# Patient Record
Sex: FEMALE | Race: WHITE | Employment: FULL TIME | URBAN - METROPOLITAN AREA
[De-identification: names, ages, dates, MRNs, and addresses within clinical notes are randomized per-mention and may not be internally consistent; named-entity substitution may affect disease eponyms.]

---

## 2018-09-10 ENCOUNTER — HOSPITAL ENCOUNTER (OUTPATIENT)
Dept: GENERAL RADIOLOGY | Age: 23
Discharge: HOME | End: 2018-09-10
Payer: COMMERCIAL

## 2018-09-10 DIAGNOSIS — M79.671 RIGHT FOOT PAIN: ICD-10-CM

## 2018-09-10 PROCEDURE — 73630 X-RAY EXAM OF FOOT: CPT

## 2019-03-29 ENCOUNTER — OFFICE VISIT (OUTPATIENT)
Dept: ORTHOPEDIC SURGERY | Age: 24
End: 2019-03-29

## 2019-03-29 VITALS
BODY MASS INDEX: 23.95 KG/M2 | WEIGHT: 158 LBS | HEIGHT: 68 IN | SYSTOLIC BLOOD PRESSURE: 116 MMHG | TEMPERATURE: 96.8 F | HEART RATE: 70 BPM | DIASTOLIC BLOOD PRESSURE: 76 MMHG | OXYGEN SATURATION: 98 %

## 2019-03-29 DIAGNOSIS — M99.01 CERVICAL SOMATIC DYSFUNCTION: ICD-10-CM

## 2019-03-29 DIAGNOSIS — M76.31 BILATERAL ILIOTIBIAL BAND TENDINITIS: Primary | ICD-10-CM

## 2019-03-29 DIAGNOSIS — M76.32 BILATERAL ILIOTIBIAL BAND TENDINITIS: Primary | ICD-10-CM

## 2019-03-29 DIAGNOSIS — M99.08 RIB CAGE REGION SOMATIC DYSFUNCTION: ICD-10-CM

## 2019-03-29 DIAGNOSIS — G57.03 PIRIFORMIS SYNDROME OF BOTH SIDES: ICD-10-CM

## 2019-03-29 DIAGNOSIS — M99.02 THORACIC REGION SOMATIC DYSFUNCTION: ICD-10-CM

## 2019-03-29 RX ORDER — ALBUTEROL SULFATE 90 UG/1
AEROSOL, METERED RESPIRATORY (INHALATION)
COMMUNITY
End: 2020-11-09

## 2019-03-29 RX ORDER — MELOXICAM 15 MG/1
TABLET ORAL
Qty: 90 TAB | Refills: 0 | Status: SHIPPED | OUTPATIENT
Start: 2019-03-29 | End: 2019-06-27

## 2019-03-29 RX ORDER — CETIRIZINE HCL 10 MG
TABLET ORAL
COMMUNITY
End: 2019-06-27

## 2019-03-29 NOTE — PROGRESS NOTES
AVS reviewed: YES  HEP: AT demonstrated  Resources Provided: YES YouTube Videos  Patient questions/concerns answered: NO. Pt denied questions/concerns  Patient verbalized understanding of treatment plan: YES

## 2019-03-29 NOTE — PROGRESS NOTES
HISTORY OF PRESENT ILLNESS    Tena Lee is a 25y.o. year old female comes in today as new patient to be evaluated and treated at the request of Evelio Prince MD for my opinion/advice regarding: hip pain B/L    Patients symptoms have been present for a few years. Pain level 6/10 lateral, It has worsened with activity. Patient has tried:  chiro prior. It is described as pain and popping in hips. Has been using prednisone as well which helped a little but was for asthma. Past Surgical History:   Procedure Laterality Date    HX  SECTION      x1    HX OTHER SURGICAL      3rd nipple removed from left side.  HX WISDOM TEETH EXTRACTION       Social History     Socioeconomic History    Marital status: SINGLE     Spouse name: Not on file    Number of children: Not on file    Years of education: Not on file    Highest education level: Not on file   Tobacco Use    Smoking status: Never Smoker    Smokeless tobacco: Never Used   Substance and Sexual Activity    Alcohol use: Yes     Comment: rare    Drug use: No     Types: Marijuana     Comment: formerly used.  Sexual activity: Yes     Birth control/protection: Pill     Current Outpatient Medications   Medication Sig Dispense Refill    docosahexanoic acid (PRENATAL DHA) 200 mg cap capsule Prenatal + DHA   take 1 tablet once a day      albuterol (PROVENTIL HFA, VENTOLIN HFA, PROAIR HFA) 90 mcg/actuation inhaler ProAir HFA 90 mcg/actuation aerosol inhaler      cetirizine (ZYRTEC) 10 mg tablet cetirizine 10 mg tablet   take 1 tablet by mouth at bedtime      nitrofurantoin, macrocrystal-monohydrate, (MACROBID) 100 mg capsule Take 100 mg by mouth two (2) times a day.  ALPRAZolam (XANAX) 0.5 mg tablet Take  by mouth.  HYDROcodone-acetaminophen (NORCO) 5-325 mg per tablet Take  by mouth.        Past Medical History:   Diagnosis Date    Anxiety     Asthma     Depression     Environmental allergies     H/O seasonal allergies     Other ill-defined conditions(799.89)     PID    Post partum depression     Psychiatric problem     anxiety     Family History   Problem Relation Age of Onset    Cancer Mother     Asthma Mother     Thyroid Disease Mother     No Known Problems Father          ROS:  No numb, tingle, fever, incont  All other systems reviewed and negative aside from that written in the HPI. Objective:  /76   Pulse 70   Temp 96.8 °F (36 °C) (Oral)   Ht 5' 7.5\" (1.715 m)   Wt 158 lb (71.7 kg)   SpO2 98%   BMI 24.38 kg/m²   GEN:  Appears stated age in NAD. HEAD:  Normocephalic, Atraumatic. NEURO:  Sensation intact to light touch. Reflexes +2/4 patellar and Achilles bilaterally. M/S: Examined standing and supine. Slump negative. TTA at C2, 4, 6 on left worse flexion and T2, 4, 5, 7 on left worse flexion Ribs 3, 4, 6 posteriro and TTP left. LE Strength +5/5 bilaterally JUAN negative bilateral .  Internal rotation normal. bilaterally  positive TTP over greater trochanter. Piriformis tender and tight bilateral   Clark's test positive bilateral  Remington test negative for hip flexor and quad bilateral   EXT: no clubbing/cyanosis. no edema. SKIN: Warm/dry without rash. HEENT: Conjunctiva/lids WNL. External canals/nares WNL. Tongue midline. PERRL, EOMI. Hearing intact. NECK: Trachea midline. Supple, Full ROM. No thyromegaly. CARDIAC: No edema. LUNGS: Normal effort. ABD: Soft, NT. No HSM. PSYCH: A+O x3. Appropriate judgment and insight. Assessment/Plan:     ICD-10-CM ICD-9-CM    1. Bilateral iliotibial band tendinitis M76.31 728.89 meloxicam (MOBIC) 15 mg tablet    M76.32     2. Piriformis syndrome of both sides G57.01 355.0 meloxicam (MOBIC) 15 mg tablet    G57.02     3.  Thoracic region somatic dysfunction M99.02 739.2 WI OSTEOPATHIC MANIP,3-4 BODY REGN   4. Rib cage region somatic dysfunction M99.08 739.8 WI OSTEOPATHIC MANIP,3-4 BODY REGN   5. Cervical somatic dysfunction M99.01 739.1 WI OSTEOPATHIC MANIP,3-4 BODY REGN       Patient (or guardian if minor) verbalizes understanding of evaluation and plan. Verbal consent obtained. Cervical, Thoracic and Rib SD treated with ME. Correction of previous malalignments verified after Tx. Pt tolerated well. Notes improvement of Sx and pain is now rated 0/10. HEP/stretches daily. Discussed stretching IT bands, piriformis and neck/upper back w/ mobic PRN and RTC 6 weeks.

## 2019-05-16 ENCOUNTER — HOSPITAL ENCOUNTER (OUTPATIENT)
Dept: GENERAL RADIOLOGY | Age: 24
Discharge: HOME | End: 2019-05-16
Payer: COMMERCIAL

## 2019-05-16 DIAGNOSIS — R05.9 COUGH: ICD-10-CM

## 2019-05-16 PROCEDURE — 71046 X-RAY EXAM CHEST 2 VIEWS: CPT

## 2019-11-15 ENCOUNTER — HOSPITAL ENCOUNTER (OUTPATIENT)
Dept: GENERAL RADIOLOGY | Age: 24
Discharge: HOME | End: 2019-11-15
Payer: COMMERCIAL

## 2019-11-15 ENCOUNTER — HOSPITAL ENCOUNTER (OUTPATIENT)
Dept: LAB | Age: 24
Discharge: HOME | End: 2019-11-15
Payer: COMMERCIAL

## 2019-11-15 DIAGNOSIS — R05.9 COUGH: ICD-10-CM

## 2019-11-15 LAB
BASOPHILS # BLD: 0 K/UL (ref 0–0.06)
BASOPHILS NFR BLD: 0 % (ref 0–3)
DIFFERENTIAL METHOD BLD: ABNORMAL
EOSINOPHIL # BLD: 0 K/UL (ref 0–0.4)
EOSINOPHIL NFR BLD: 0 % (ref 0–5)
ERYTHROCYTE [DISTWIDTH] IN BLOOD BY AUTOMATED COUNT: 14 % (ref 11.6–14.5)
HCT VFR BLD AUTO: 46.3 % (ref 35–45)
HGB BLD-MCNC: 15.6 G/DL (ref 12–16)
LYMPHOCYTES # BLD: 4.1 K/UL (ref 0.8–3.5)
LYMPHOCYTES NFR BLD: 28 % (ref 20–51)
MCH RBC QN AUTO: 34.3 PG (ref 24–34)
MCHC RBC AUTO-ENTMCNC: 33.7 G/DL (ref 31–37)
MCV RBC AUTO: 101.8 FL (ref 74–97)
MONOCYTES # BLD: 0.3 K/UL (ref 0–1)
MONOCYTES NFR BLD: 2 % (ref 2–9)
NEUTS BAND NFR BLD MANUAL: 3 % (ref 0–5)
NEUTS SEG # BLD: 10.3 K/UL (ref 1.8–8)
NEUTS SEG NFR BLD: 67 % (ref 42–75)
PLATELET # BLD AUTO: 306 K/UL (ref 135–420)
PLATELET COMMENTS,PCOM: ABNORMAL
PMV BLD AUTO: 10.1 FL (ref 9.2–11.8)
RBC # BLD AUTO: 4.55 M/UL (ref 4.2–5.3)
RBC MORPH BLD: ABNORMAL
WBC # BLD AUTO: 14.7 K/UL (ref 4.6–13.2)

## 2019-11-15 PROCEDURE — 36415 COLL VENOUS BLD VENIPUNCTURE: CPT

## 2019-11-15 PROCEDURE — 85025 COMPLETE CBC W/AUTO DIFF WBC: CPT

## 2019-11-15 PROCEDURE — 71046 X-RAY EXAM CHEST 2 VIEWS: CPT

## 2019-11-15 PROCEDURE — 82785 ASSAY OF IGE: CPT

## 2019-11-18 LAB — IGE SERPL-ACNC: 403 IU/ML (ref 6–495)

## 2020-07-06 ENCOUNTER — HOSPITAL ENCOUNTER (EMERGENCY)
Age: 25
Discharge: HOME | End: 2020-07-06
Attending: EMERGENCY MEDICINE
Payer: COMMERCIAL

## 2020-07-06 VITALS
OXYGEN SATURATION: 97 % | RESPIRATION RATE: 16 BRPM | WEIGHT: 170 LBS | SYSTOLIC BLOOD PRESSURE: 136 MMHG | DIASTOLIC BLOOD PRESSURE: 97 MMHG | HEART RATE: 85 BPM | TEMPERATURE: 98.7 F | BODY MASS INDEX: 25.85 KG/M2

## 2020-07-06 DIAGNOSIS — T50.905A MEDICATION REACTION, INITIAL ENCOUNTER: Primary | ICD-10-CM

## 2020-07-06 PROCEDURE — 74011250637 HC RX REV CODE- 250/637: Performed by: EMERGENCY MEDICINE

## 2020-07-06 PROCEDURE — 99282 EMERGENCY DEPT VISIT SF MDM: CPT

## 2020-07-06 RX ORDER — HYDROXYZINE PAMOATE 25 MG/1
25 CAPSULE ORAL
Status: COMPLETED | OUTPATIENT
Start: 2020-07-06 | End: 2020-07-06

## 2020-07-06 RX ORDER — HYDROXYZINE PAMOATE 25 MG/1
25 CAPSULE ORAL
Qty: 15 CAP | Refills: 0 | Status: SHIPPED | OUTPATIENT
Start: 2020-07-06 | End: 2020-07-20

## 2020-07-06 RX ADMIN — HYDROXYZINE PAMOATE 25 MG: 25 CAPSULE ORAL at 16:42

## 2020-07-06 NOTE — DISCHARGE INSTRUCTIONS
Patient Education     Side Effects of Medicine: Care Instructions  Your Care Instructions  Medicines are a big part of treatment for many health problems. But all medicines have side effects. Often these are mild problems. They might include a dry mouth or upset stomach. But sometimes medicines can cause dangerous side effects. One example is a bad allergic reaction. The best treatment will depend on what side effects you have. If you have a serious side effect, you may need to stop taking the medicine. You may also need to take another medicine to treat the side effect. If you have a mild side effect, it may go away after you take the medicine for a while. The doctor has checked you carefully, but problems can develop later. If you notice any problems or new symptoms, get medical treatment right away. Follow-up care is a key part of your treatment and safety. Be sure to make and go to all appointments, and call your doctor if you are having problems. It's also a good idea to know your test results and keep a list of the medicines you take. How can you care for yourself at home? · Be safe with medicines. Take your medicines exactly as prescribed. Call your doctor if you think you are having a problem with your medicine. · Call your doctor if side effects bother you and you wonder if you should keep taking a medicine. Your doctor may be able to lower your dose or change your medicine. Do not suddenly quit taking your medicine unless a doctor tells you to. · Make sure your doctor has a list of all the medicines, vitamins, supplements, and herbal remedies you take. Ask about side effects. When should you call for help? Call 911 anytime you think you may need emergency care. For example, call if:  · You have symptoms of a severe allergic reaction. These may include:  ¨ Sudden raised, red areas (hives) all over your body. ¨ Swelling of the throat, mouth, lips, or tongue. ¨ Trouble breathing.   ¨ Passing out (losing consciousness). Or you may feel very lightheaded or suddenly feel weak, confused, or restless. Call your doctor now or seek immediate medical care if:  · You have symptoms of an allergic reaction, such as:  ¨ A rash or hives (raised, red areas on the skin). ¨ Itching. ¨ Swelling. ¨ Belly pain, nausea, or vomiting. Watch closely for changes in your health, and be sure to contact your doctor if:  · You think you are having a new problem with your medicine. · You do not get better as expected. Where can you learn more? Go to [employer]  Enter D152 in the search box to learn more about \"Side Effects of Medicine: Care Instructions. \"   © 0656-9666 [employer], [employer]. Care instructions adapted under license by [facility] (which disclaims liability or warranty for this information). This care instruction is for use with your licensed healthcare professional. If you have questions about a medical condition or this instruction, always ask your healthcare professional. [place] [address] any warranty or liability for your use of this information.   Content Version: 08.3.535851; Current as of: November 20, 2015

## 2020-07-06 NOTE — ED PROVIDER NOTES
HPI says she is currently on a prednisone taper due to her asthma. She says she missed 1 of the days of medications recently but then restarted a day later. Over the last 24 hours patient complains of \"feeling weird and tingly all over\". She also complains of numbness and tingling in her fingers and hands and also in her perioral area. Patient also says she has a history of anxiety and is concerned that she may be having an anxiety attack as well. She says her asthma symptoms are doing well now and her current respiratory effort is that of her baseline. She denies any fever chills chest pain abdominal pain or nausea vomiting. No other complaints given at this time. Past Medical History:   Diagnosis Date    ADHD     Alternating constipation and diarrhea     Anxiety     Anxiety and depression     Asthma     Chronic neck and back pain     Cough, persistent     Depression     Environmental allergies     GERD (gastroesophageal reflux disease)     H/O seasonal allergies     Heavy menses     Joint pain     Left-sided chest wall pain     Migraines     Panic attacks     Pelvic inflammatory disease     Pelvic pain     Piercing     bilateral tragus (unable to remove jewelry)    Post partum depression     Psychiatric problem     anxiety    Rash     under buttocks & inner thighs    Seizures ([address]     remote (only once)    SOB (shortness of breath)     Tattoos 2019    recent tattoo       Past Surgical History:   Procedure Laterality Date    HX  SECTION  05/04/2018    x1    HX DILATION AND CURETTAGE  2019    HX OTHER SURGICAL      3rd nipple removed from left side.     HX PELVIC LAPAROSCOPY  2019    diagnostic laparoscopy with lysis of adhesions and ablation of endometriosis    HX WISDOM TEETH EXTRACTION           Family History:   Problem Relation Age of Onset   24 [facility] Cancer Mother         brain    Asthma Mother     Thyroid Disease Mother     No Known Problems Father  Asthma Maternal Grandmother     Lupus Maternal Grandmother        Social History     Socioeconomic History    Marital status: SINGLE     Spouse name: Not on file    Number of children: Not on file    Years of education: Not on file    Highest education level: Not on file   Occupational History    Not on file   Social Needs    Financial resource strain: Not on file    Food insecurity     Worry: Not on file     Inability: Not on file    Transportation needs     Medical: Not on file     Non-medical: Not on file   Tobacco Use    Smoking status: Never Smoker    Smokeless tobacco: Never Used   Substance and Sexual Activity    Alcohol use: Yes     Alcohol/week: 2.0 - 3.0 standard drinks     Types: 2 - 3 Shots of liquor per week     Comment: liquor or wine    Drug use: Not Currently    Sexual activity: Not on file   Lifestyle    Physical activity     Days per week: Not on file     Minutes per session: Not on file    Stress: Not on file   Relationships    Social connections     Talks on phone: Not on file     Gets together: Not on file     Attends Tenriism service: Not on file     Active member of club or organization: Not on file     Attends meetings of clubs or organizations: Not on file     Relationship status: Not on file    Intimate partner violence     Fear of current or ex partner: Not on file     Emotionally abused: Not on file     Physically abused: Not on file     Forced sexual activity: Not on file   Other Topics Concern    Not on file   Social History Narrative    Not on file         ALLERGIES: Adhesive; Adhesive tape-silicones; and Egg white    Review of Systems   Constitutional: Negative. HENT: Negative. Respiratory: Negative. Cardiovascular: Negative. Gastrointestinal: Negative. Genitourinary: Negative. Musculoskeletal: Negative. Neurological: Negative. Psychiatric/Behavioral: Negative.         Vitals:    07/06/20 1605   BP: (!) 136/97   Pulse: 85   Resp: 16 Temp: 98.7 °F (37.1 °C)   SpO2: 97%   Weight: 77.1 kg (170 lb)            Physical Exam  Vitals signs and nursing note reviewed. Constitutional:       Appearance: She is well-developed. HENT:      Head: Normocephalic and atraumatic. Eyes:      Conjunctiva/sclera: Conjunctivae normal.      Pupils: Pupils are equal, round, and reactive to light. Neck:      Musculoskeletal: Normal range of motion and neck supple. Cardiovascular:      Rate and Rhythm: Normal rate and regular rhythm. Pulmonary:      Effort: Pulmonary effort is normal.      Breath sounds: Normal breath sounds. Abdominal:      Palpations: Abdomen is soft. Musculoskeletal: Normal range of motion. Skin:     General: Skin is warm and dry. Neurological:      Mental Status: She is alert and oriented to person, place, and time. MDM       Procedures    Patient has normal clinical exam I spent time talking with her about possible side effects from prednisone taper versus anxiety. After talking with physician patient felt better and she understands and agrees with the disposition and follow-up plan.   Danny Lazaro MD 4:40 PM

## 2020-07-06 NOTE — ED NOTES
Introduced myself as Primary Nurse. Encouraged to voice any concerns and/or change in their condition. Call bell in easy reach. Patient's fall risk assessed, bed locked and in lowest position. Lights are on in room and area around bed dry and free of clutter. Informed staff will be making rounds every half hour or sooner if condition warrants. Will explain tests and reason why they are to be done. Patient knows that as soon as MD reviews results he will be in to update patient. Current CDC guidelines followed upon entering room: Mask, goggles and gloves worn when entering room. Strict handwashing observed after removing gloves after this encounter with patient.

## 2020-11-09 ENCOUNTER — APPOINTMENT (OUTPATIENT)
Dept: GENERAL RADIOLOGY | Age: 25
End: 2020-11-09
Attending: EMERGENCY MEDICINE
Payer: COMMERCIAL

## 2020-11-09 ENCOUNTER — HOSPITAL ENCOUNTER (EMERGENCY)
Age: 25
Discharge: HOME | End: 2020-11-09
Attending: EMERGENCY MEDICINE
Payer: COMMERCIAL

## 2020-11-09 VITALS
OXYGEN SATURATION: 96 % | HEIGHT: 68 IN | WEIGHT: 170 LBS | TEMPERATURE: 98.6 F | BODY MASS INDEX: 25.76 KG/M2 | SYSTOLIC BLOOD PRESSURE: 123 MMHG | RESPIRATION RATE: 20 BRPM | DIASTOLIC BLOOD PRESSURE: 84 MMHG | HEART RATE: 98 BPM

## 2020-11-09 DIAGNOSIS — J45.21 INTERMITTENT ASTHMA WITH ACUTE EXACERBATION, UNSPECIFIED ASTHMA SEVERITY: ICD-10-CM

## 2020-11-09 DIAGNOSIS — B34.9 VIRAL SYNDROME: Primary | ICD-10-CM

## 2020-11-09 DIAGNOSIS — R06.00 DYSPNEA, UNSPECIFIED TYPE: ICD-10-CM

## 2020-11-09 LAB
APPEARANCE UR: ABNORMAL
BACTERIA URNS QL MICRO: ABNORMAL /HPF
BILIRUB UR QL: ABNORMAL
COLOR UR: ABNORMAL
DEPRECATED S PYO AG THROAT QL EIA: NEGATIVE
EPITH CASTS URNS QL MICRO: ABNORMAL /LPF (ref 0–5)
FLUAV AG NPH QL IA: NEGATIVE
FLUBV AG NOSE QL IA: NEGATIVE
GLUCOSE UR STRIP.AUTO-MCNC: NEGATIVE MG/DL
HCG UR QL: NEGATIVE
HGB UR QL STRIP: NEGATIVE
KETONES UR QL STRIP.AUTO: ABNORMAL MG/DL
LEUKOCYTE ESTERASE UR QL STRIP.AUTO: ABNORMAL
MUCOUS THREADS URNS QL MICRO: ABNORMAL /LPF
NITRITE UR QL STRIP.AUTO: NEGATIVE
PH UR STRIP: 6.5 [PH] (ref 5–8)
PROT UR STRIP-MCNC: 100 MG/DL
RBC #/AREA URNS HPF: ABNORMAL /HPF (ref 0–5)
SP GR UR REFRACTOMETRY: >1.03 (ref 1–1.03)
UROBILINOGEN UR QL STRIP.AUTO: 1 EU/DL (ref 0.2–1)
WBC URNS QL MICRO: ABNORMAL /HPF (ref 0–4)

## 2020-11-09 PROCEDURE — 81025 URINE PREGNANCY TEST: CPT

## 2020-11-09 PROCEDURE — 87635 SARS-COV-2 COVID-19 AMP PRB: CPT

## 2020-11-09 PROCEDURE — 87070 CULTURE OTHR SPECIMN AEROBIC: CPT

## 2020-11-09 PROCEDURE — 99284 EMERGENCY DEPT VISIT MOD MDM: CPT

## 2020-11-09 PROCEDURE — 87804 INFLUENZA ASSAY W/OPTIC: CPT

## 2020-11-09 PROCEDURE — 74011250637 HC RX REV CODE- 250/637: Performed by: EMERGENCY MEDICINE

## 2020-11-09 PROCEDURE — 81001 URINALYSIS AUTO W/SCOPE: CPT

## 2020-11-09 PROCEDURE — 71045 X-RAY EXAM CHEST 1 VIEW: CPT

## 2020-11-09 PROCEDURE — 87880 STREP A ASSAY W/OPTIC: CPT

## 2020-11-09 PROCEDURE — 74011636637 HC RX REV CODE- 636/637: Performed by: EMERGENCY MEDICINE

## 2020-11-09 PROCEDURE — 87147 CULTURE TYPE IMMUNOLOGIC: CPT

## 2020-11-09 RX ORDER — PREDNISONE 20 MG/1
60 TABLET ORAL
Status: COMPLETED | OUTPATIENT
Start: 2020-11-09 | End: 2020-11-09

## 2020-11-09 RX ORDER — ALBUTEROL SULFATE 90 UG/1
1-2 AEROSOL, METERED RESPIRATORY (INHALATION)
Qty: 1 INHALER | Refills: 0 | Status: SHIPPED | OUTPATIENT
Start: 2020-11-09 | End: 2022-09-24

## 2020-11-09 RX ORDER — IBUPROFEN 400 MG/1
800 TABLET ORAL
Status: COMPLETED | OUTPATIENT
Start: 2020-11-09 | End: 2020-11-09

## 2020-11-09 RX ORDER — AZELASTINE 1 MG/ML
SPRAY, METERED NASAL
COMMUNITY
End: 2021-07-02

## 2020-11-09 RX ORDER — LEVONORGESTREL / ETHINYL ESTRADIOL AND ETHINYL ESTRADIOL 150-30(84)
KIT ORAL
COMMUNITY

## 2020-11-09 RX ORDER — ONDANSETRON 4 MG/1
4 TABLET, ORALLY DISINTEGRATING ORAL
Status: COMPLETED | OUTPATIENT
Start: 2020-11-09 | End: 2020-11-09

## 2020-11-09 RX ORDER — LEVONORGESTREL / ETHINYL ESTRADIOL AND ETHINYL ESTRADIOL 150-30(84)
KIT ORAL
COMMUNITY
End: 2020-11-09

## 2020-11-09 RX ORDER — PREDNISONE 20 MG/1
60 TABLET ORAL DAILY
Qty: 12 TAB | Refills: 0 | Status: SHIPPED | OUTPATIENT
Start: 2020-11-09 | End: 2020-11-13

## 2020-11-09 RX ORDER — BENZONATATE 100 MG/1
100 CAPSULE ORAL
Qty: 20 CAP | Refills: 0 | Status: SHIPPED | OUTPATIENT
Start: 2020-11-09 | End: 2020-11-16

## 2020-11-09 RX ORDER — BUDESONIDE AND FORMOTEROL FUMARATE DIHYDRATE 160; 4.5 UG/1; UG/1
AEROSOL RESPIRATORY (INHALATION)
COMMUNITY
Start: 2020-10-06 | End: 2021-07-02

## 2020-11-09 RX ORDER — IBUPROFEN 600 MG/1
600 TABLET ORAL
Qty: 18 TAB | Refills: 0 | Status: SHIPPED | OUTPATIENT
Start: 2020-11-09 | End: 2021-07-02

## 2020-11-09 RX ORDER — ONDANSETRON 4 MG/1
4 TABLET, ORALLY DISINTEGRATING ORAL
Qty: 14 TAB | Refills: 0 | Status: SHIPPED | OUTPATIENT
Start: 2020-11-09 | End: 2021-07-02

## 2020-11-09 RX ADMIN — PREDNISONE 60 MG: 20 TABLET ORAL at 19:43

## 2020-11-09 RX ADMIN — IBUPROFEN 800 MG: 400 TABLET, FILM COATED ORAL at 19:43

## 2020-11-09 RX ADMIN — ONDANSETRON 4 MG: 4 TABLET, ORALLY DISINTEGRATING ORAL at 19:42

## 2020-11-10 ENCOUNTER — PATIENT OUTREACH (OUTPATIENT)
Dept: CASE MANAGEMENT | Age: 25
End: 2020-11-10

## 2020-11-10 NOTE — ED NOTES
Pt resting quietly on stretcher. No complaints or requests at this time. Awaiting results & plan of care.

## 2020-11-10 NOTE — ED PROVIDER NOTES
Pt /co body aches, cough, sob - says h/o asthma, occas wheezing- sujective fever. No chest pain. No abd pain. Not p reg per pt.  + nausea, no vomiting. freq diarrhea, about 4-5 per day. No swelling. Took benadryl at home, mild relief only. Sent by pcp for eval.   Mild sore throat, no diff swallowing. No headache. Past Medical History:   Diagnosis Date    ADHD     Alternating constipation and diarrhea     Anxiety     Anxiety and depression     Asthma     Chronic neck and back pain     Cough, persistent     Depression     Environmental allergies     GERD (gastroesophageal reflux disease)     H/O seasonal allergies     Heavy menses     Joint pain     Left-sided chest wall pain     Migraines     Panic attacks     Pelvic inflammatory disease     Pelvic pain     Piercing     bilateral tragus (unable to remove jewelry)    Post partum depression     Psychiatric problem     anxiety    Rash     under buttocks & inner thighs    Seizures ([address]     remote (only once)    SOB (shortness of breath)     Tattoos 2019    recent tattoo       Past Surgical History:   Procedure Laterality Date    HX  SECTION  05/04/2018    x1    HX DILATION AND CURETTAGE  2019    HX OTHER SURGICAL      3rd nipple removed from left side.     HX PELVIC LAPAROSCOPY  2019    diagnostic laparoscopy with lysis of adhesions and ablation of endometriosis    HX WISDOM TEETH EXTRACTION           Family History:   Problem Relation Age of Onset    Cancer Mother         brain    Asthma Mother     Thyroid Disease Mother     No Known Problems Father     Asthma Maternal Grandmother     Lupus Maternal Grandmother        Social History     Socioeconomic History    Marital status: SINGLE     Spouse name: Not on file    Number of children: Not on file    Years of education: Not on file    Highest education level: Not on file   Occupational History    Not on file   Social Needs    Financial resource strain: Not on file    Food insecurity     Worry: Not on file     Inability: Not on file    Transportation needs     Medical: Not on file     Non-medical: Not on file   Tobacco Use    Smoking status: Never Smoker    Smokeless tobacco: Never Used   Substance and Sexual Activity    Alcohol use: Yes     Alcohol/week: 2.0 - 3.0 standard drinks     Types: 2 - 3 Shots of liquor per week     Comment: liquor or wine    Drug use: Yes     Types: Marijuana    Sexual activity: Not on file   Lifestyle    Physical activity     Days per week: Not on file     Minutes per session: Not on file    Stress: Not on file   Relationships    Social connections     Talks on phone: Not on file     Gets together: Not on file     Attends Christianity service: Not on file     Active member of club or organization: Not on file     Attends meetings of clubs or organizations: Not on file     Relationship status: Not on file    Intimate partner violence     Fear of current or ex partner: Not on file     Emotionally abused: Not on file     Physically abused: Not on file     Forced sexual activity: Not on file   Other Topics Concern    Not on file   Social History Narrative    Not on file         ALLERGIES: Adhesive; Adhesive tape-silicones; and Egg white    Review of Systems   Constitutional: Positive for fatigue and fever. HENT: Positive for congestion. Respiratory: Positive for cough and wheezing. Cardiovascular: Negative for chest pain. Gastrointestinal: Positive for diarrhea and nausea. Negative for blood in stool and vomiting. Musculoskeletal: Positive for myalgias. Skin: Negative for rash. Neurological: Negative for light-headedness. All other systems reviewed and are negative. Vitals:    11/09/20 1840   BP: 123/84   Pulse: 98   Resp: 20   Temp: 98.6 °F (37 °C)   SpO2: 96%   Weight: 77.1 kg (170 lb)   Height: 5' 8\" (1.727 m)            Physical Exam  Vitals signs and nursing note reviewed. Constitutional:       Appearance: She is well-developed. She is not diaphoretic. HENT:      Head: Normocephalic and atraumatic. Eyes:      Pupils: Pupils are equal, round, and reactive to light. Neck:      Musculoskeletal: Normal range of motion. Cardiovascular:      Rate and Rhythm: Normal rate and regular rhythm. Heart sounds: No murmur. Pulmonary:      Effort: Pulmonary effort is normal.      Breath sounds: Wheezing (+ exp) present. Abdominal:      Palpations: Abdomen is soft. Tenderness: There is no abdominal tenderness. Musculoskeletal:         General: No tenderness. Skin:     General: Skin is dry. Capillary Refill: Capillary refill takes less than 2 seconds. Findings: No rash. Neurological:      Mental Status: She is alert and oriented to person, place, and time.           MDM       Procedures      Vitals:  Patient Vitals for the past 12 hrs:   Temp Pulse Resp BP SpO2   11/09/20 1840 98.6 °F (37 °C) 98 20 123/84 96 %         Medications ordered:   Medications   predniSONE (DELTASONE) tablet 60 mg (60 mg Oral Given 11/9/20 1943)   ondansetron (ZOFRAN ODT) tablet 4 mg (4 mg Oral Given 11/9/20 1942)   ibuprofen (MOTRIN) tablet 800 mg (800 mg Oral Given 11/9/20 1943)         Lab findings:  Recent Results (from the past 12 hour(s))   STREP AG SCREEN, GROUP A    Collection Time: 11/09/20  6:44 PM    Specimen: Throat   Result Value Ref Range    Group A Strep Ag ID Negative     INFLUENZA A & B AG (RAPID TEST)    Collection Time: 11/09/20  6:44 PM   Result Value Ref Range    Influenza A Antigen Negative NEG      Influenza B Antigen Negative NEG     URINALYSIS W/ RFLX MICROSCOPIC    Collection Time: 11/09/20  7:28 PM   Result Value Ref Range    Color DARK YELLOW      Appearance CLOUDY      Specific gravity >1.030 (H) 1.005 - 1.030    pH (UA) 6.5 5.0 - 8.0      Protein 100 (A) NEG mg/dL    Glucose Negative NEG mg/dL    Ketone TRACE (A) NEG mg/dL    Bilirubin MODERATE (A) NEG Blood Negative NEG      Urobilinogen 1.0 0.2 - 1.0 EU/dL    Nitrites Negative NEG      Leukocyte Esterase SMALL (A) NEG     HCG URINE, QL    Collection Time: 11/09/20  7:28 PM   Result Value Ref Range    HCG urine, QL Negative NEG     URINE MICROSCOPIC ONLY    Collection Time: 11/09/20  7:28 PM   Result Value Ref Range    WBC 4 to 10 0 - 4 /hpf    RBC NONE 0 - 5 /hpf    Epithelial cells 1+ 0 - 5 /lpf    Bacteria 1+ (A) NEG /hpf    Mucus 2+ (A) NEG /lpf           X-Ray, CT or other radiology findings or impressions:  XR CHEST PORT    (Results Pending)       Progress notes, Consult notes or additional Procedure notes:   8:31 PM pt feels much better, declines iv or further testing, req dc now. Verb und of need to self quarantine until covid results back. No emc. Det ret inst given. Diagnosis:   1. Viral syndrome    2. Dyspnea, unspecified type    3. Intermittent asthma with acute exacerbation, unspecified asthma severity        Disposition: home    Follow-up Information     Follow up With Specialties Details Why Contact Info    Malik Singer MD Family Medicine Schedule an appointment as soon as possible for a visit in 2 days  [facility] [address] [place] [id number] [phone] [address] [facility] EMERGENCY DEPT Emergency Medicine Go to As needed, If symptoms worsen [address] [facility]  [phone]           Patient's Medications   Start Taking    ALBUTEROL (PROVENTIL HFA, VENTOLIN HFA, PROAIR HFA) 90 MCG/ACTUATION INHALER    Take 1-2 Puffs by inhalation every four (4) hours as needed for Wheezing. BENZONATATE (TESSALON PERLES) 100 MG CAPSULE    Take 1 Cap by mouth three (3) times daily as needed for Cough for up to 7 days. IBUPROFEN (MOTRIN) 600 MG TABLET    Take 1 Tab by mouth every six (6) hours as needed for Pain. ONDANSETRON (ZOFRAN ODT) 4 MG DISINTEGRATING TABLET    Take 1 Tab by mouth every eight (8) hours as needed for Nausea.     PREDNISONE (DELTASONE) 20 MG TABLET    Take 60 mg by mouth daily for 4 days. Continue Taking    ALPRAZOLAM (XANAX) 0.5 MG TABLET    Take 0.5 mg by mouth three (3) times daily as needed for Anxiety. AZELASTINE (ASTELIN) 137 MCG (0.1 %) NASAL SPRAY    azelastine 137 mcg (0.1 %) nasal spray aerosol   Spray 2 sprays twice a day by intranasal route. L-NORGEST&E ESTRADIOL-E ESTRAD (ASHLYNA) 0.15 MG-30 MCG (84)/10 MCG (7) 3MPK    Ashlyna 0.15 mg-30 mcg (84)/10 mcg(7) tablets,3 month dose pack   1 tab po daily    LANSOPRAZOLE (PREVACID) 30 MG CAPSULE    lansoprazole 30 mg capsule twice daily    MONTELUKAST (SINGULAIR) 10 MG TABLET    take 1 tablet by mouth at bedtime    SYMBICORT 160-4.5 MCG/ACTUATION HFAA    inhale 2 puffs by mouth twice a day Rinse mouth after use    TRAZODONE (DESYREL) 50 MG TABLET    trazodone 50 mg tablet at bedtime   These Medications have changed    No medications on file   Stop Taking    ALBUTEROL (PROVENTIL HFA, VENTOLIN HFA, PROAIR HFA) 90 MCG/ACTUATION INHALER    ProAir HFA 90 mcg/actuation aerosol inhaler as needed FOR WHEEZING/DYSPNEA EVERY 4 HOURS    ESCITALOPRAM OXALATE (LEXAPRO) 10 MG TABLET    take 1 tablet by mouth at bedtime    L-NORGEST&E ESTRADIOL-E ESTRAD (SEASONIQUE) 0.15 MG-30 MCG (84)/10 MCG (7) 3MPK    Take  by mouth. LEVONORGESTREL PO    Take  by mouth nightly.

## 2020-11-10 NOTE — PROGRESS NOTES
Date/Time:  11/10/2020 10:44 AM   Call within 2 business days of discharge: Yes   Attempted to reach patient by telephone. Left HIPPA compliant message requesting a return call. Will attempt to reach patient again.

## 2020-11-10 NOTE — ED NOTES
Pt was provided with bedside commode & sterile specimen cup with instructions for collection of clean-catch urine specimen. Pt was provided with 16oz ice water as well. Pt instructed to notify staff when specimen has been obtained. She verbalized understanding. Call bell within easy reach.

## 2020-11-11 ENCOUNTER — PATIENT OUTREACH (OUTPATIENT)
Dept: CASE MANAGEMENT | Age: 25
End: 2020-11-11

## 2020-11-11 NOTE — PROGRESS NOTES
Date/Time:  11/11/2020 3:28 PM Attempted to reach patient by telephone. Left HIPPA compliant message requesting a return call. Will attempt to reach patient again.

## 2020-11-12 LAB
BACTERIA SPEC CULT: ABNORMAL
BACTERIA SPEC CULT: ABNORMAL
SARS-COV-2, COV2NT: NOT DETECTED
SERVICE CMNT-IMP: ABNORMAL

## 2020-11-13 ENCOUNTER — PATIENT OUTREACH (OUTPATIENT)
Dept: CASE MANAGEMENT | Age: 25
End: 2020-11-13

## 2020-11-13 NOTE — PROGRESS NOTES
Date/Time:  11/13/2020 2:23 PM  Attempted to reach patient by telephone. Left HIPPA compliant message requesting a return call. This is third attempt to contact patient. Episode resolved.

## 2020-12-28 ENCOUNTER — HOSPITAL ENCOUNTER (EMERGENCY)
Age: 25
Discharge: HOME | End: 2020-12-28
Attending: EMERGENCY MEDICINE
Payer: COMMERCIAL

## 2020-12-28 VITALS
BODY MASS INDEX: 25.76 KG/M2 | HEART RATE: 96 BPM | OXYGEN SATURATION: 97 % | TEMPERATURE: 98.7 F | DIASTOLIC BLOOD PRESSURE: 85 MMHG | WEIGHT: 170 LBS | HEIGHT: 68 IN | RESPIRATION RATE: 18 BRPM | SYSTOLIC BLOOD PRESSURE: 131 MMHG

## 2020-12-28 DIAGNOSIS — F41.1 ANXIETY STATE: Primary | ICD-10-CM

## 2020-12-28 PROCEDURE — 99283 EMERGENCY DEPT VISIT LOW MDM: CPT

## 2020-12-28 RX ORDER — HYDROXYZINE PAMOATE 25 MG/1
25 CAPSULE ORAL
Status: DISCONTINUED | OUTPATIENT
Start: 2020-12-28 | End: 2020-12-28 | Stop reason: HOSPADM

## 2020-12-28 RX ORDER — HYDROXYZINE PAMOATE 25 MG/1
25 CAPSULE ORAL
Qty: 15 CAP | Refills: 0 | Status: SHIPPED | OUTPATIENT
Start: 2020-12-28 | End: 2021-01-11

## 2020-12-28 NOTE — ED PROVIDER NOTES
HPI patient presents complaining of exacerbation of her chronic anxiety. She says that she was off her anxiety medicines for about 3 days because her prescription ran out. She says she recently got the prescription refilled and has been taking her anxiety medicine for the past 2 days. However, she feels like it has not been effective today because she is been feeling symptoms of of stress and anxiety. She says she is been having some numbness and tingling in her face fingers and feet. Additionally she says she has been feeling \"stressed\". No other specifics given at this time. Past Medical History:   Diagnosis Date    ADHD     Alternating constipation and diarrhea     Anxiety     Anxiety and depression     Asthma     Chronic neck and back pain     Cough, persistent     Depression     Environmental allergies     GERD (gastroesophageal reflux disease)     H/O seasonal allergies     Heavy menses     Joint pain     Left-sided chest wall pain     Migraines     Panic attacks     Pelvic inflammatory disease     Pelvic pain     Piercing     bilateral tragus (unable to remove jewelry)    Post partum depression     Psychiatric problem     anxiety    Rash     under buttocks & inner thighs    Seizures ([address]     remote (only once)    SOB (shortness of breath)     Tattoos 2019    recent tattoo       Past Surgical History:   Procedure Laterality Date    HX  SECTION  05/04/2018    x1    HX DILATION AND CURETTAGE  2019    HX OTHER SURGICAL      3rd nipple removed from left side.     HX PELVIC LAPAROSCOPY  2019    diagnostic laparoscopy with lysis of adhesions and ablation of endometriosis    HX WISDOM TEETH EXTRACTION           Family History:   Problem Relation Age of Onset   24 [facility] Cancer Mother         brain    Asthma Mother     Thyroid Disease Mother     No Known Problems Father     Asthma Maternal Grandmother     Lupus Maternal Grandmother        Social History Socioeconomic History    Marital status: SINGLE     Spouse name: Not on file    Number of children: Not on file    Years of education: Not on file    Highest education level: Not on file   Occupational History    Not on file   Social Needs    Financial resource strain: Not on file    Food insecurity     Worry: Not on file     Inability: Not on file    Transportation needs     Medical: Not on file     Non-medical: Not on file   Tobacco Use    Smoking status: Never Smoker    Smokeless tobacco: Never Used   Substance and Sexual Activity    Alcohol use: Yes     Alcohol/week: 2.0 - 3.0 standard drinks     Types: 2 - 3 Shots of liquor per week     Comment: liquor or wine    Drug use: Yes     Types: Marijuana    Sexual activity: Not on file   Lifestyle    Physical activity     Days per week: Not on file     Minutes per session: Not on file    Stress: Not on file   Relationships    Social connections     Talks on phone: Not on file     Gets together: Not on file     Attends [employer] service: Not on file     Active member of club or organization: Not on file     Attends meetings of clubs or organizations: Not on file     Relationship status: Not on file    Intimate partner violence     Fear of current or ex partner: Not on file     Emotionally abused: Not on file     Physically abused: Not on file     Forced sexual activity: Not on file   Other Topics Concern    Not on file   Social History Narrative    Not on file         ALLERGIES: Adhesive, Adhesive tape-silicones, and Egg white    Review of Systems   Constitutional: Negative. HENT: Negative. Eyes: Negative. Respiratory: Negative. Cardiovascular: Negative. Gastrointestinal: Negative. Genitourinary: Negative. Musculoskeletal: Negative. Skin: Negative. Neurological: Negative. Psychiatric/Behavioral: Positive for agitation.        Vitals:    12/28/20 1354 12/28/20 1411   BP: 131/85    Pulse: (!) 123 96   Resp: 18    Temp: 98.7 °F (37.1 °C)    SpO2: 97%    Weight: 77.1 kg (170 lb)    Height: 5' 8\" (1.727 m)             Physical Exam  Vitals signs and nursing note reviewed. Constitutional:       Appearance: She is well-developed. Comments: Is alert and oriented appears somewhat anxious but columns with talking   HENT:      Head: Normocephalic and atraumatic. Nose: Nose normal.      Mouth/Throat:      Mouth: Mucous membranes are moist.   Eyes:      Conjunctiva/sclera: Conjunctivae normal.      Pupils: Pupils are equal, round, and reactive to light. Neck:      Musculoskeletal: Normal range of motion and neck supple. Cardiovascular:      Rate and Rhythm: Normal rate and regular rhythm. Pulmonary:      Effort: Pulmonary effort is normal.      Breath sounds: Normal breath sounds. Abdominal:      Palpations: Abdomen is soft. Musculoskeletal: Normal range of motion. Skin:     General: Skin is warm and dry. Neurological:      Mental Status: She is alert and oriented to person, place, and time. Psychiatric:         Mood and Affect: Mood normal.         Behavior: Behavior normal.      Comments: Patient is alert and oriented. She denies any thoughts of harm to herself or others. MDM  Number of Diagnoses or Management Options  Diagnosis management comments: Spent time talking the patient answering her questions and providing reassurance. Explained to her that she should continue her anxiety medication as it takes several days for her to reach a therapeutic level. She stated relief with this and said she would continue as directed. We will give her a prescription to help with her anxiety breakthroughs with instructions to follow-up with her psychiatrist.  Patient understands and agrees with the disposition and follow-up plan.   Alexa Mora MD 3:20 PM         Procedures

## 2020-12-28 NOTE — ED TRIAGE NOTES
Patient states she was out of her cymbalta for 3 days and was unable to get it filled until after hank. She now has the medication and has been back on it for 2 days but states she was having withdrawls. She states her heart feels like its racing at times. She states she has been tearful and sometimes just cant' stop crying.

## 2021-03-10 ENCOUNTER — HOSPITAL ENCOUNTER (EMERGENCY)
Age: 26
Discharge: HOME | End: 2021-03-11
Attending: EMERGENCY MEDICINE
Payer: COMMERCIAL

## 2021-03-10 DIAGNOSIS — R10.9 ABDOMINAL CRAMPS: ICD-10-CM

## 2021-03-10 DIAGNOSIS — K60.2 ANAL FISSURE: Primary | ICD-10-CM

## 2021-03-10 LAB
ALBUMIN SERPL-MCNC: 3.2 G/DL (ref 3.4–5)
ALBUMIN/GLOB SERPL: 0.8 {RATIO} (ref 0.8–1.7)
ALP SERPL-CCNC: 77 U/L (ref 45–117)
ALT SERPL-CCNC: 10 U/L (ref 13–56)
ANION GAP SERPL CALC-SCNC: 7 MMOL/L (ref 3–18)
APPEARANCE UR: ABNORMAL
AST SERPL-CCNC: 12 U/L (ref 10–38)
BACTERIA URNS QL MICRO: ABNORMAL /HPF
BASOPHILS # BLD: 0 K/UL (ref 0–0.1)
BASOPHILS NFR BLD: 0 % (ref 0–2)
BILIRUB SERPL-MCNC: 0.4 MG/DL (ref 0.2–1)
BILIRUB UR QL: NEGATIVE
BUN SERPL-MCNC: 7 MG/DL (ref 7–18)
BUN/CREAT SERPL: 9 (ref 12–20)
CALCIUM SERPL-MCNC: 8.6 MG/DL (ref 8.5–10.1)
CHLORIDE SERPL-SCNC: 108 MMOL/L (ref 100–111)
CO2 SERPL-SCNC: 25 MMOL/L (ref 21–32)
COLOR UR: YELLOW
CREAT SERPL-MCNC: 0.79 MG/DL (ref 0.6–1.3)
DIFFERENTIAL METHOD BLD: ABNORMAL
EOSINOPHIL # BLD: 0.1 K/UL (ref 0–0.4)
EOSINOPHIL NFR BLD: 1 % (ref 0–5)
EPITH CASTS URNS QL MICRO: ABNORMAL /LPF (ref 0–5)
ERYTHROCYTE [DISTWIDTH] IN BLOOD BY AUTOMATED COUNT: 13.3 % (ref 11.6–14.5)
GLOBULIN SER CALC-MCNC: 4.1 G/DL (ref 2–4)
GLUCOSE SERPL-MCNC: 78 MG/DL (ref 74–99)
GLUCOSE UR STRIP.AUTO-MCNC: NEGATIVE MG/DL
HCG UR QL: NEGATIVE
HCT VFR BLD AUTO: 42.8 % (ref 35–45)
HGB BLD-MCNC: 14.8 G/DL (ref 12–16)
HGB UR QL STRIP: NEGATIVE
KETONES UR QL STRIP.AUTO: NEGATIVE MG/DL
LEUKOCYTE ESTERASE UR QL STRIP.AUTO: ABNORMAL
LIPASE SERPL-CCNC: 62 U/L (ref 73–393)
LYMPHOCYTES # BLD: 4.9 K/UL (ref 0.9–3.6)
LYMPHOCYTES NFR BLD: 43 % (ref 21–52)
MCH RBC QN AUTO: 34.7 PG (ref 24–34)
MCHC RBC AUTO-ENTMCNC: 34.6 G/DL (ref 31–37)
MCV RBC AUTO: 100.5 FL (ref 74–97)
MONOCYTES # BLD: 0.8 K/UL (ref 0.05–1.2)
MONOCYTES NFR BLD: 7 % (ref 3–10)
NEUTS SEG # BLD: 4.4 K/UL (ref 1.8–8)
NEUTS SEG NFR BLD: 39 % (ref 40–73)
NITRITE UR QL STRIP.AUTO: NEGATIVE
OTHER CELLS NFR BLD MANUAL: 10 %
PH UR STRIP: 6 [PH] (ref 5–8)
PLATELET # BLD AUTO: 273 K/UL (ref 135–420)
PLATELET COMMENTS,PCOM: ABNORMAL
PMV BLD AUTO: 9.6 FL (ref 9.2–11.8)
POTASSIUM SERPL-SCNC: 3.7 MMOL/L (ref 3.5–5.5)
PROT SERPL-MCNC: 7.3 G/DL (ref 6.4–8.2)
PROT UR STRIP-MCNC: NEGATIVE MG/DL
RBC # BLD AUTO: 4.26 M/UL (ref 4.2–5.3)
RBC #/AREA URNS HPF: ABNORMAL /HPF (ref 0–5)
RBC MORPH BLD: ABNORMAL
SODIUM SERPL-SCNC: 140 MMOL/L (ref 136–145)
SP GR UR REFRACTOMETRY: 1.02 (ref 1–1.03)
UROBILINOGEN UR QL STRIP.AUTO: 1 EU/DL (ref 0.2–1)
WBC # BLD AUTO: 11.4 K/UL (ref 4.6–13.2)
WBC URNS QL MICRO: ABNORMAL /HPF (ref 0–4)

## 2021-03-10 PROCEDURE — 80053 COMPREHEN METABOLIC PANEL: CPT

## 2021-03-10 PROCEDURE — 83690 ASSAY OF LIPASE: CPT

## 2021-03-10 PROCEDURE — 81025 URINE PREGNANCY TEST: CPT

## 2021-03-10 PROCEDURE — 99283 EMERGENCY DEPT VISIT LOW MDM: CPT

## 2021-03-10 PROCEDURE — 81001 URINALYSIS AUTO W/SCOPE: CPT

## 2021-03-10 PROCEDURE — 85025 COMPLETE CBC W/AUTO DIFF WBC: CPT

## 2021-03-11 VITALS
TEMPERATURE: 97.8 F | OXYGEN SATURATION: 97 % | DIASTOLIC BLOOD PRESSURE: 76 MMHG | HEIGHT: 68 IN | WEIGHT: 170 LBS | RESPIRATION RATE: 18 BRPM | HEART RATE: 85 BPM | BODY MASS INDEX: 25.76 KG/M2 | SYSTOLIC BLOOD PRESSURE: 120 MMHG

## 2021-03-11 RX ORDER — HYDROCORTISONE 25 MG/G
CREAM TOPICAL 4 TIMES DAILY
Qty: 30 G | Refills: 0 | Status: SHIPPED | OUTPATIENT
Start: 2021-03-11 | End: 2021-07-02

## 2021-03-11 NOTE — ED NOTES
Rectal exam done by provider, fissures noted. Awaiting further orders from provider. No active bleeding noted at this time.

## 2021-03-11 NOTE — ED NOTES
I have reviewed discharge instructions with the patient. The patient verbalized understanding. Patient looks comfortable, left ED with stable condition. Ambulatory, steady gait noted.

## 2021-03-11 NOTE — ED PROVIDER NOTES
HPI 42-year-old female who presents to the ER with complaint of having rectal bleeding and abdominal cramps. Patient states her psych medication were changed and she developed abdominal cramps. Today she has some rectal bleeding when she was bowel movement. No other complaints. Past Medical History:   Diagnosis Date    ADHD     Alternating constipation and diarrhea     Anxiety     Anxiety and depression     Asthma     Chronic neck and back pain     Cough, persistent     Depression     Environmental allergies     GERD (gastroesophageal reflux disease)     H/O seasonal allergies     Heavy menses     Joint pain     Left-sided chest wall pain     Migraines     Panic attacks     Pelvic inflammatory disease     Pelvic pain     Piercing     bilateral tragus (unable to remove jewelry)    Post partum depression     Psychiatric problem     anxiety    Rash     under buttocks & inner thighs    Seizures ([address]     remote (only once)    SOB (shortness of breath)     Tattoos 2019    recent tattoo       Past Surgical History:   Procedure Laterality Date    HX  SECTION  05/04/2018    x1    HX DILATION AND CURETTAGE  2019    HX OTHER SURGICAL      3rd nipple removed from left side.     HX PELVIC LAPAROSCOPY  2019    diagnostic laparoscopy with lysis of adhesions and ablation of endometriosis    HX WISDOM TEETH EXTRACTION           Family History:   Problem Relation Age of Onset    Cancer Mother         brain    Asthma Mother     Thyroid Disease Mother     No Known Problems Father     Asthma Maternal Grandmother     Lupus Maternal Grandmother        Social History     Socioeconomic History    Marital status: SINGLE     Spouse name: Not on file    Number of children: Not on file    Years of education: Not on file    Highest education level: Not on file   Occupational History    Not on file   Social Needs    Financial resource strain: Not on file    Food insecurity Worry: Not on file     Inability: Not on file    Transportation needs     Medical: Not on file     Non-medical: Not on file   Tobacco Use    Smoking status: Never Smoker    Smokeless tobacco: Never Used   Substance and Sexual Activity    Alcohol use: Yes     Alcohol/week: 2.0 - 3.0 standard drinks     Types: 2 - 3 Shots of liquor per week     Comment: liquor or wine    Drug use: Yes     Types: Marijuana    Sexual activity: Not on file   Lifestyle    Physical activity     Days per week: Not on file     Minutes per session: Not on file    Stress: Not on file   Relationships    Social connections     Talks on phone: Not on file     Gets together: Not on file     Attends Christian service: Not on file     Active member of club or organization: Not on file     Attends meetings of clubs or organizations: Not on file     Relationship status: Not on file    Intimate partner violence     Fear of current or ex partner: Not on file     Emotionally abused: Not on file     Physically abused: Not on file     Forced sexual activity: Not on file   Other Topics Concern    Not on file   Social History Narrative    Not on file         ALLERGIES: Adhesive, Adhesive tape-silicones, and Egg white    Review of Systems   Constitutional: Negative. HENT: Negative. Eyes: Negative. Respiratory: Negative. Cardiovascular: Negative. Gastrointestinal: Positive for abdominal pain (cramps). Endocrine: Negative. Genitourinary: Negative. Musculoskeletal: Negative. Skin: Negative. Allergic/Immunologic: Negative. Neurological: Negative. Hematological: Negative. Psychiatric/Behavioral: Negative. All other systems reviewed and are negative. Vitals:    03/10/21 2046   BP: 125/85   Pulse: (!) 108   Resp: 18   Temp: 98.4 °F (36.9 °C)   SpO2: 96%   Weight: 77.1 kg (170 lb)   Height: 5' 8\" (1.727 m)            Physical Exam  Vitals signs and nursing note reviewed.    Constitutional:       General: She is not in acute distress. Appearance: She is well-developed. She is not diaphoretic. HENT:      Head: Normocephalic. Right Ear: External ear normal.      Left Ear: External ear normal.      Mouth/Throat:      Pharynx: No oropharyngeal exudate. Eyes:      General: No scleral icterus. Right eye: No discharge. Left eye: No discharge. Conjunctiva/sclera: Conjunctivae normal.      Pupils: Pupils are equal, round, and reactive to light. Neck:      Musculoskeletal: Normal range of motion and neck supple. Thyroid: No thyromegaly. Vascular: No JVD. Trachea: No tracheal deviation. Cardiovascular:      Rate and Rhythm: Normal rate and regular rhythm. Heart sounds: Normal heart sounds. No murmur. No friction rub. No gallop. Pulmonary:      Effort: Pulmonary effort is normal. No respiratory distress. Breath sounds: Normal breath sounds. No stridor. No wheezing or rales. Chest:      Chest wall: No tenderness. Abdominal:      General: Bowel sounds are normal. There is no distension. Palpations: Abdomen is soft. There is no mass. Tenderness: There is no abdominal tenderness. There is no guarding or rebound. Genitourinary:     Comments: RECTAL AREA: (+) fissure at 2 o'clock positions. Hemacult: negative  Musculoskeletal: Normal range of motion. General: No tenderness. Lymphadenopathy:      Cervical: No cervical adenopathy. Skin:     General: Skin is warm and dry. Coloration: Skin is not pale. Findings: No erythema or rash. Neurological:      Mental Status: She is alert and oriented to person, place, and time. Cranial Nerves: No cranial nerve deficit. Motor: No abnormal muscle tone. Coordination: Coordination normal.      Deep Tendon Reflexes: Reflexes normal.          MDM       Procedures    Dx: fissure, abdomina cramp    Disp: F/U PCP in 2 days. Return to ER prn. Anusol HC l.   Return to ER prn.    Dictation disclaimer:  Please note that this dictation was completed with [employer], the computer voice recognition software. Quite often unanticipated grammatical, syntax, homophones, and other interpretive errors are inadvertently transcribed by the computer software. Please disregard these errors. Please excuse any errors that have escaped final proofreading.

## 2021-03-11 NOTE — ED NOTES
Received patient in bed 10. Patient A/O x 4, complaining of diarrhea and noticing bright red blood in toilet. Patient denies dizziness, feeling weak, chest pain, SOB and other complaints. Awaiting evaluation from provider.

## 2021-07-02 ENCOUNTER — APPOINTMENT (OUTPATIENT)
Dept: GENERAL RADIOLOGY | Age: 26
End: 2021-07-02
Attending: PHYSICIAN ASSISTANT
Payer: COMMERCIAL

## 2021-07-02 ENCOUNTER — HOSPITAL ENCOUNTER (EMERGENCY)
Age: 26
Discharge: HOME | End: 2021-07-02
Attending: EMERGENCY MEDICINE
Payer: COMMERCIAL

## 2021-07-02 VITALS
RESPIRATION RATE: 20 BRPM | BODY MASS INDEX: 25.76 KG/M2 | WEIGHT: 170 LBS | HEART RATE: 92 BPM | HEIGHT: 68 IN | DIASTOLIC BLOOD PRESSURE: 79 MMHG | SYSTOLIC BLOOD PRESSURE: 116 MMHG | TEMPERATURE: 98.6 F | OXYGEN SATURATION: 95 %

## 2021-07-02 DIAGNOSIS — J06.9 ACUTE UPPER RESPIRATORY INFECTION: Primary | ICD-10-CM

## 2021-07-02 DIAGNOSIS — Z20.822 SUSPECTED COVID-19 VIRUS INFECTION: ICD-10-CM

## 2021-07-02 LAB
DEPRECATED S PYO AG THROAT QL EIA: NEGATIVE
SARS-COV-2, COV2: NORMAL

## 2021-07-02 PROCEDURE — U0003 INFECTIOUS AGENT DETECTION BY NUCLEIC ACID (DNA OR RNA); SEVERE ACUTE RESPIRATORY SYNDROME CORONAVIRUS 2 (SARS-COV-2) (CORONAVIRUS DISEASE [COVID-19]), AMPLIFIED PROBE TECHNIQUE, MAKING USE OF HIGH THROUGHPUT TECHNOLOGIES AS DESCRIBED BY CMS-2020-01-R: HCPCS

## 2021-07-02 PROCEDURE — 71045 X-RAY EXAM CHEST 1 VIEW: CPT

## 2021-07-02 PROCEDURE — 87880 STREP A ASSAY W/OPTIC: CPT

## 2021-07-02 PROCEDURE — 87070 CULTURE OTHR SPECIMN AEROBIC: CPT

## 2021-07-02 PROCEDURE — 99283 EMERGENCY DEPT VISIT LOW MDM: CPT

## 2021-07-02 RX ORDER — PSEUDOEPHEDRINE HCL 30 MG
30 TABLET ORAL
COMMUNITY
End: 2022-06-06

## 2021-07-02 RX ORDER — BENZONATATE 100 MG/1
100 CAPSULE ORAL
Qty: 21 CAPSULE | Refills: 0 | Status: SHIPPED | OUTPATIENT
Start: 2021-07-02 | End: 2021-07-09

## 2021-07-02 RX ORDER — FLUOXETINE HYDROCHLORIDE 40 MG/1
40 CAPSULE ORAL DAILY
COMMUNITY

## 2021-07-02 NOTE — Clinical Note
[address] [facility] EMERGENCY DEPT  [id number] [address]  [phone]    Work/School Note    Date: 7/2/2021     To Whom It May concern:    Jarrett Sweet was evaulated by the following provider(s):  Attending Provider: Forrest Sales MD  Physician Assistant: Malathi Hill, [address] virus is suspected. Per the CDC guidelines we recommend home isolation until the following conditions are all met:    1. At least 10 days have passed since symptoms first appeared and  2. At least 24 hours have passed since last fever without the use of fever-reducing medications and  3.  Symptoms (e.g., cough, shortness of breath) have improved    Sincerely,          NATHAN Calderón

## 2021-07-02 NOTE — DISCHARGE INSTRUCTIONS
HOME ISOLATION PRECAUTIONS DURING COVID-19 OUTBREAK (per CDC guidelines)    1) Stay home except to get medical care:  People who are mildly ill with COVID-19 are able to isolate at home during their illness. You should restrict activities outside your home, except for getting medical care. Do not go to work, school, or public areas. Avoid using public transportation, ride-sharing, or taxis. 2) Separate yourself from other people and animals in your home  People: As much as possible, you should stay in a specific room and away from other people in your home. Also, you should use a separate bathroom, if available. 3) Animals: You should restrict contact with pets and other animals while you are sick with COVID-19, just like you would around other people. Although there have not been reports of pets or other animals becoming sick with COVID-19, it is still recommended that people sick with COVID-19 limit contact with animals until more information is known about the virus. When possible, have another member of your household care for your animals while you are sick. If you are sick with COVID-19, avoid contact with your pet, including petting, snuggling, being kissed or licked, and sharing food. If you must care for your pet or be around animals while you are sick, wash your hands before and after you interact with pets and wear a facemask. See COVID-19 and Animals for more information. 4) Call ahead before visiting your doctor  If you have a medical appointment, call the healthcare provider and tell them that you have or may have COVID-19. This will help the healthcare providers office take steps to keep other people from getting infected or exposed. 5) Wear a facemask  You should wear a facemask when you are around other people (e.g., sharing a room or vehicle) or pets and before you enter a healthcare providers office.  If you are not able to wear a facemask (for example, because it causes trouble breathing), then people who live with you should not stay in the same room with you, or they should wear a facemask if they enter your room. 6) Cover your coughs and sneezes  Cover your mouth and nose with a tissue when you cough or sneeze. Throw used tissues in a lined trash can. Immediately wash your hands with soap and water for at least 20 seconds or, if soap and water are not available, clean your hands with an alcohol-based hand  that contains at least 60% alcohol. 7) Clean your hands often  Wash your hands often with soap and water for at least 20 seconds, especially after blowing your nose, coughing, or sneezing; going to the bathroom; and before eating or preparing food. If soap and water are not readily available, use an alcohol-based hand  with at least 60% alcohol, covering all surfaces of your hands and rubbing them together until they feel dry. 8) Soap and water are the best option if hands are visibly dirty. Avoid touching your eyes, nose, and mouth with unwashed hands. 9) Avoid sharing personal household items  You should not share dishes, drinking glasses, cups, eating utensils, towels, or bedding with other people or pets in your home. After using these items, they should be washed thoroughly with soap and water. 10) Clean all high-touch surfaces everyday  High touch surfaces include counters, tabletops, doorknobs, bathroom fixtures, toilets, phones, keyboards, tablets, and bedside tables. Also, clean any surfaces that may have blood, stool, or body fluids on them. Use a household cleaning spray or wipe, according to the label instructions. Labels contain instructions for safe and effective use of the cleaning product including precautions you should take when applying the product, such as wearing gloves and making sure you have good ventilation during use of the product.     11) Monitor your symptoms  Seek prompt medical attention if your illness is worsening (e.g., difficulty breathing). Before seeking care, call your healthcare provider and tell them that you have, or are being evaluated for, COVID-19. Put on a facemask before you enter the facility. These steps will help the healthcare providers office to keep other people in the office or waiting room from getting infected or exposed. Ask your healthcare provider to call the local or state health department. Persons who are placed under active monitoring or facilitated self-monitoring should follow instructions provided by their local health department or occupational health professionals, as appropriate. When working with your local health department check their available hours. 12) If you have a medical emergency and need to call 911, notify the dispatch personnel that you have, or are being evaluated for COVID-19. If possible, put on a facemask before emergency medical services arrive. 13) Discontinuing home isolation  Patients with confirmed COVID-19 should remain under home isolation precautions until the risk of secondary transmission to others is thought to be low. The decision to discontinue home isolation precautions should be made on a case-by-case basis, in consultation with healthcare providers and [facility] and local health departments. Recommended precautions for household members, intimate partners, and caregivers in a nonhealthcare setting of  A patient with symptomatic laboratory-confirmed COVID-19   or   a patient under investigation  Household members, intimate partners, and caregivers in a nonhealthcare setting may have close contact2 with a person with symptomatic, laboratory-confirmed COVID-19 or a person under investigation.  Close contacts should monitor their health; they should call their healthcare provider right away if they develop symptoms suggestive of COVID-19 (e.g., fever, cough, shortness of breath)     Close contacts should also follow these recommendations:  Make sure that you understand and can help the patient follow their healthcare provider's instructions for medication(s) and care. You should help the patient with basic needs in the home and provide support for getting groceries, prescriptions, and other personal needs. Monitor the patient's symptoms. If the patient is getting sicker, call his or her healthcare provider and tell them that the patient has laboratory-confirmed COVID-19. This will help the healthcare provider's office take steps to keep other people in the office or waiting room from getting infected. Ask the healthcare provider to call the local or formerly [facility] health department for additional guidance. If the patient has a medical emergency and you need to call 911, notify the dispatch personnel that the patient has, or is being evaluated for COVID-19. Household members should stay in another room or be  from the patient as much as possible. Household members should use a separate bedroom and bathroom, if available. Prohibit visitors who do not have an essential need to be in the home. Make sure that shared spaces in the home have good air flow, such as by an air conditioner or an opened window, weather permitting. Perform hand hygiene frequently. Wash your hands often with soap and water for at least 20 seconds or use an alcohol-based hand  that contains 60 to 95% alcohol, covering all surfaces of your hands and rubbing them together until they feel dry. Soap and water should be used preferentially if hands are visibly dirty. You and the patient should wear a facemask if you are in the same room. Wear a disposable facemask and gloves when you touch or have contact with the patient's blood, stool, or body fluids, such as saliva, sputum, nasal mucus, vomit, urine. Throw out disposable facemasks and gloves after using them. Do not reuse. When removing personal protective equipment, first remove and dispose of gloves.  Then, immediately clean your hands with soap and water or alcohol-based hand . Next, remove and dispose of facemask, and immediately clean your hands again with soap and water or alcohol-based hand . [address] thoroughly. Immediately remove and wash clothes or bedding that have blood, stool, or body fluids on them. Wear disposable gloves while handling soiled items and keep soiled items away from your body. Clean your hands (with soap and water or an alcohol-based hand ) immediately after removing your gloves. Read and follow directions on labels of laundry or clothing items and detergent. In general, using a normal laundry detergent according to washing machine instructions and dry thoroughly using the warmest temperatures recommended on the clothing label. Discuss any additional questions with your state or local health department or healthcare provider. Footnotes  2Close contact is defined as--  a) being within approximately 6 feet (2 meters) of a COVID-19 case for a prolonged period of time; close contact can occur while caring for, living with, visiting, or sharing a health care waiting area or room with a COVID-19 case  - or -  b) having direct contact with infectious secretions of a COVID-19 case (e.g., being coughed on).

## 2021-07-02 NOTE — ED NOTES
NATHAN Diane reviewed discharge instructions with the patient. The patient verbalized understanding. Patient armband removed and shredded. Current Discharge Medication List      START taking these medications    Details   benzonatate (Tessalon Perles) 100 mg capsule Take 1 Capsule by mouth three (3) times daily as needed for Cough for up to 7 days.   Qty: 21 Capsule, Refills: 0  Start date: 7/2/2021, End date: 7/9/2021

## 2021-07-02 NOTE — ED TRIAGE NOTES
C/o cough, sore throat, nasal congestion and recent loss of sense of smell. States testing negative for Covid one week ago.

## 2021-07-02 NOTE — ED PROVIDER NOTES
EMERGENCY DEPARTMENT HISTORY AND PHYSICAL EXAM    5:10 PM      Date: 7/2/2021  Patient Name: Jaleesa Ivey    History of Presenting Illness     Chief Complaint   Patient presents with    Sore Throat    Headache    Nasal Congestion       History Provided By: Patient    Additional History (Context): Jaleesa Ivey is a 32 y.o. female with noted PMH who presents with c/o sore throat, HA, rhinorrhea, and nasal congestion x 3-4 days. Pt notes she lost her sense of taste last night. Did not receive her COVID vaccines. Denies sick contacts, known exposure to [place]. Pt notes she was evaluated at [facility] last week, diagnosed with bronchitis. Discharged with Prednisone and Sudafed which she has been taking as prescribed. Negative COVID test at that time. Notes symptoms improved but reoccurred 3-4 days ago. PCP: Ivana Grace MD    Current Outpatient Medications   Medication Sig Dispense Refill    lurasidone (Latuda) 60 mg tab tablet Take  by mouth.  FLUoxetine (PROzac) 40 mg capsule Take 40 mg by mouth daily.  pseudoephedrine (Sudafed) 30 mg tablet Take 30 mg by mouth every six (6) hours as needed for Congestion.  PREDNISONE PO Take 3 Tablets by mouth daily.  benzonatate (Tessalon Perles) 100 mg capsule Take 1 Capsule by mouth three (3) times daily as needed for Cough for up to 7 days. 21 Capsule 0    L-Norgest&E Estradiol-E Estrad (Ashlyna) 0.15 mg-30 mcg (84)/10 mcg (7) 3MPk Ashlyna 0.15 mg-30 mcg (84)/10 mcg(7) tablets,3 month dose pack   1 tab po daily      albuterol (PROVENTIL HFA, VENTOLIN HFA, PROAIR HFA) 90 mcg/actuation inhaler Take 1-2 Puffs by inhalation every four (4) hours as needed for Wheezing.  1 Inhaler 0    lansoprazole (PREVACID) 30 mg capsule lansoprazole 30 mg capsule twice daily      montelukast (SINGULAIR) 10 mg tablet take 1 tablet by mouth at bedtime  0    traZODone (DESYREL) 50 mg tablet trazodone 50 mg tablet at bedtime      ALPRAZolam Areli Rodríguez) 0.5 mg tablet Take 0.5 mg by mouth three (3) times daily as needed for Anxiety. Past History     Past Medical History:  Past Medical History:   Diagnosis Date    ADHD     Alternating constipation and diarrhea     Anxiety     Anxiety and depression     Asthma     Chronic neck and back pain     Cough, persistent     Depression     Environmental allergies     GERD (gastroesophageal reflux disease)     H/O seasonal allergies     Heavy menses     Joint pain     Left-sided chest wall pain     Migraines     Panic attacks     Pelvic inflammatory disease     Pelvic pain     Piercing     bilateral tragus (unable to remove jewelry)    Post partum depression     Psychiatric problem     anxiety    Rash     under buttocks & inner thighs    Seizures ([address]     remote (only once)    SOB (shortness of breath)     Tattoos 2019    recent tattoo       Past Surgical History:  Past Surgical History:   Procedure Laterality Date    HX  SECTION  05/04/2018    x1    HX DILATION AND CURETTAGE  2019    HX OTHER SURGICAL      3rd nipple removed from left side.  HX PELVIC LAPAROSCOPY  2019    diagnostic laparoscopy with lysis of adhesions and ablation of endometriosis    HX WISDOM TEETH EXTRACTION         Family History:  Family History   Problem Relation Age of Onset   [facility] Cancer Mother         brain    Asthma Mother     Thyroid Disease Mother     No Known Problems Father     Asthma Maternal Grandmother     Lupus Maternal Grandmother        Social History:  Social History     Tobacco Use    Smoking status: Current Every Day Smoker    Smokeless tobacco: Never Used   Substance Use Topics    Alcohol use: Yes     Alcohol/week: 2.0 - 3.0 standard drinks     Types: 2 - 3 Shots of liquor per week     Comment: liquor or wine    Drug use: Yes     Types: Marijuana       Allergies:   Allergies   Allergen Reactions    Adhesive Rash     BLISTERS AND IRRITATION WHERE ADHESIVES TOUCH SKIN  Adhesive Tape-Silicones Rash     Not allergic to silicone - ADHESIVE CAUSES SKIN IRRITATION    Egg White Other (comments)     GI distress         Review of Systems       Review of Systems   Constitutional: Negative for chills and fever. HENT: Positive for rhinorrhea, sinus pressure and sore throat. Respiratory: Negative for shortness of breath. Cardiovascular: Negative for chest pain. Gastrointestinal: Negative for abdominal pain, nausea and vomiting. Skin: Negative for rash. Neurological: Positive for headaches. Negative for weakness. All other systems reviewed and are negative. Physical Exam     Visit Vitals  /79 (BP 1 Location: Left upper arm, BP Patient Position: At rest)   Pulse 92   Temp 98.6 °F (37 °C)   Resp 20   Ht 5' 8\" (1.727 m)   Wt 77.1 kg (170 lb)   SpO2 95%   BMI 25.85 kg/m²         Physical Exam  Vitals and nursing note reviewed. Constitutional:       General: She is not in acute distress. Appearance: She is well-developed. She is not ill-appearing, toxic-appearing or diaphoretic. HENT:      Head: Normocephalic and atraumatic. Right Ear: Tympanic membrane and ear canal normal.      Left Ear: Tympanic membrane and ear canal normal.      Nose: No congestion or rhinorrhea. Mouth/Throat:      Mouth: Mucous membranes are moist.      Pharynx: Posterior oropharyngeal erythema present. No oropharyngeal exudate. Tonsils: No tonsillar exudate or tonsillar abscesses. Cardiovascular:      Rate and Rhythm: Normal rate and regular rhythm. Heart sounds: Normal heart sounds. No murmur heard. No friction rub. No gallop. Pulmonary:      Effort: Pulmonary effort is normal. No respiratory distress. Breath sounds: Normal breath sounds. No wheezing or rales. Comments: Dry cough throughout examination   Musculoskeletal:         General: Normal range of motion. Cervical back: Normal range of motion and neck supple.    Skin:     General: Skin is warm. Findings: No rash. Neurological:      Mental Status: She is alert. Diagnostic Study Results     Labs -  Recent Results (from the past 12 hour(s))   SARS-COV-2    Collection Time: 07/02/21  5:35 PM   Result Value Ref Range    SARS-CoV-2 Please find results under separate order     STREP AG SCREEN, GROUP A    Collection Time: 07/02/21  5:35 PM    Specimen: Throat   Result Value Ref Range    Group A Strep Ag ID Negative         Radiologic Studies -   XR CHEST PORT    (Results Pending)     No acute process     Medical Decision Making   I am the first provider for this patient. I reviewed the vital signs, available nursing notes, past medical history, past surgical history, family history and social history. Vital Signs-Reviewed the patient's vital signs. Records Reviewed: Nursing Notes and Old Medical Records (Time of Review: 5:10 PM)    ED Course: Progress Notes, Reevaluation, and Consults:  7:05 PM  Reviewed results with patient. Discussed need for close outpatient follow-up this week for reassessment. Discussed strict return precautions, including fever, chest pain, shortness of breath, or any other medical concerns. Discussed importance of quarantine x 10 days from symptom onset. Provider Notes (Medical Decision Making): 42-year-old female who presents to the ED due to sore throat, headache, rhinorrhea, nasal congestion, and loss of taste. Afebrile, nontoxic-appearing, looks well. No evidence of otitis media/externa, strep pharyngitis, pneumonia. Covid swab sent and pending. Stable for discharge with symptomatic management, close outpatient follow-up for further assessment. Strict return precautions provided. Diagnosis     Clinical Impression:   1. Acute upper respiratory infection    2.  Suspected COVID-19 virus infection        Disposition: home     Follow-up Information     Follow up With Specialties Details [facility] [place] [address] EMERGENCY DEPT Emergency Medicine If symptoms worsen Steve Ontiveros [address]     Berna Shahid MD Family Medicine Schedule an appointment as soon as possible for a visit   [address] [place] [address] Carl   [phone]             Discharge Medication List as of 7/2/2021  6:58 PM      START taking these medications    Details   benzonatate (Tessalon Perles) 100 mg capsule Take 1 Capsule by mouth three (3) times daily as needed for Cough for up to 7 days. , Normal, Disp-21 Capsule, R-0         CONTINUE these medications which have NOT CHANGED    Details   lurasidone (Latuda) 60 mg tab tablet Take  by mouth., Historical Med      FLUoxetine (PROzac) 40 mg capsule Take 40 mg by mouth daily. , Historical Med      pseudoephedrine (Sudafed) 30 mg tablet Take 30 mg by mouth every six (6) hours as needed for Congestion. , Historical Med      PREDNISONE PO Take 3 Tablets by mouth daily. , Historical Med      L-Norgest&E Estradiol-E Estrad (Ashlyna) 0.15 mg-30 mcg (84)/10 mcg (7) 3MPk Ashlyna 0.15 mg-30 mcg (84)/10 mcg(7) tablets,3 month dose pack   1 tab po daily, Historical Med      albuterol (PROVENTIL HFA, VENTOLIN HFA, PROAIR HFA) 90 mcg/actuation inhaler Take 1-2 Puffs by inhalation every four (4) hours as needed for Wheezing., Normal, Disp-1 Inhaler,R-0      lansoprazole (PREVACID) 30 mg capsule lansoprazole 30 mg capsule twice daily, Historical Med      montelukast (SINGULAIR) 10 mg tablet take 1 tablet by mouth at bedtime, Historical Med, R-0      traZODone (DESYREL) 50 mg tablet trazodone 50 mg tablet at bedtime, Historical Med      ALPRAZolam (XANAX) 0.5 mg tablet Take 0.5 mg by mouth three (3) times daily as needed for Anxiety. , Historical Med             Dictation disclaimer:  Please note that this dictation was completed with [employer], the [employer] voice recognition software.   Quite often unanticipated grammatical, syntax, homophones, and other interpretive errors are inadvertently transcribed by the computer software. Please disregard these errors. Please excuse any errors that have escaped final proofreading.

## 2021-07-03 ENCOUNTER — PATIENT OUTREACH (OUTPATIENT)
Dept: CASE MANAGEMENT | Age: 26
End: 2021-07-03

## 2021-07-03 NOTE — PROGRESS NOTES
Patient contacted regarding COVID-19 testing. Discussed COVID-19 related testing which was pending at this time. Test results were pending. Patient informed of results, if available? n/a. Care Transition Nurse contacted the patient by telephone to perform post discharge assessment. Call within 2 business days of discharge: Yes Verified name and  with patient as identifiers. Provided introduction to self, and explanation of the CTN/ACM role, and reason for call due to risk factors for infection and/or exposure to COVID-19. Symptoms reviewed with patient who verbalized the following symptoms: fatigue, pain or aching joints, cough, shortness of breath, loss or taste or smell, sweating, diarrhea, fast heart rate, dizziness/lightheadedness, no new symptoms, no worsening symptoms and upper chest/head congestion. Patient reports her cough is productive of yellow/green mucous. Her SOB typically occurs only with activity. Overall she feels better today. Due to no new or worsening symptoms encounter was not routed to provider for escalation. Discussed follow-up appointments. If no appointment was previously scheduled, appointment scheduling offered:  Encouraged her to schedule a follow up appt. [facility] follow up appointment(s): No future appointments. Non-[facility] follow up appointment(s): TBD    Interventions to address risk factors: n/a     Advance Care Planning:   Does patient have an Advance Directive: yes, reviewed and current. Educated patient about risk for severe COVID-19 due to risk factors according to CDC guidelines. CTN reviewed discharge instructions, medical action plan and red flag symptoms with the patient who verbalized understanding. Discussed COVID vaccination status: no. Education provided on COVID-19 vaccination as appropriate. Discussed exposure protocols and quarantine with CDC Guidelines.  Patient was given an opportunity to verbalize any questions and concerns and agrees to contact CTN or health care provider for questions related to their healthcare. Reviewed and educated patient on any new and changed medications related to discharge diagnosis     Was patient discharged with a pulse oximeter? no     CTN provided contact information. Plan for follow up call within the next 14 days based on severity of symptoms and risk factors.

## 2021-07-04 LAB — SARS-COV-2, COV2NT: NOT DETECTED

## 2021-07-05 LAB
BACTERIA SPEC CULT: NORMAL
SERVICE CMNT-IMP: NORMAL

## 2021-07-07 ENCOUNTER — HOSPITAL ENCOUNTER (EMERGENCY)
Age: 26
Discharge: LEFT BEFORE DISCHARGE | End: 2021-07-07
Payer: COMMERCIAL

## 2021-07-07 VITALS
TEMPERATURE: 98.9 F | SYSTOLIC BLOOD PRESSURE: 146 MMHG | DIASTOLIC BLOOD PRESSURE: 111 MMHG | OXYGEN SATURATION: 95 % | RESPIRATION RATE: 20 BRPM | HEART RATE: 103 BPM

## 2021-07-07 PROCEDURE — 75810000275 HC EMERGENCY DEPT VISIT NO LEVEL OF CARE

## 2021-07-07 NOTE — ED TRIAGE NOTES
Pt reports loss of taste X 1 week. Pt also states \" Im having anxiety, I dont want to kill myself but I don't want to be here\" Pt reports feelings of being very sad X 1 day.  Pt reports hx of behavioral health admission in the past

## 2021-07-08 NOTE — ED TRIAGE NOTES
Patient called to place in bed assignment; no response and unable to locate patient in lobby, bathroom or parking lot.

## 2021-07-18 ENCOUNTER — PATIENT OUTREACH (OUTPATIENT)
Dept: CASE MANAGEMENT | Age: 26
End: 2021-07-18

## 2021-07-18 NOTE — PROGRESS NOTES
Patient resolved from [address] Transitions episode on 07/18/21. Discussed COVID-19 related testing which was available at this time. Test results were negative. Patient informed of results, if available? yes     Patient/family has been provided the following resources and education related to COVID-19:                         Signs, symptoms and red flags related to COVID-19            CDC exposure and quarantine guidelines            [facility] exposure contact - [phone]            Contact for their local Department of Health                 Patient currently reports that the following symptoms have improved:  sore throat, congestion, headache, cough. Cough is productive of yellow/green mucous. Patient has not scheduled an appt to see her PCP due to dealing with her grandparents' recent hospitalization and discharge. Advised patient to contact PCP tomorrow to schedule a visit or at least visit a [facility] or similar facility for evaluation since symptoms are ongoing. She verbalized understanding. She had an ED visit to [facility] on 7/7 as well for anxiety and left before being evaluated. Patient states her anxiety has been worse lately and she plans to contact her psychiatrist tomorrow to schedule a visit. She does not express any SI/HI at this time. No further outreach scheduled with this CTN. Episode of Care resolved. Patient has this CTN contact information if future needs arise.

## 2021-09-20 ENCOUNTER — APPOINTMENT (OUTPATIENT)
Dept: CT IMAGING | Age: 26
End: 2021-09-20
Attending: EMERGENCY MEDICINE
Payer: COMMERCIAL

## 2021-09-20 ENCOUNTER — HOSPITAL ENCOUNTER (EMERGENCY)
Age: 26
Discharge: HOME | End: 2021-09-20
Attending: EMERGENCY MEDICINE
Payer: COMMERCIAL

## 2021-09-20 VITALS
DIASTOLIC BLOOD PRESSURE: 77 MMHG | HEIGHT: 68 IN | HEART RATE: 74 BPM | BODY MASS INDEX: 24.25 KG/M2 | WEIGHT: 160 LBS | RESPIRATION RATE: 17 BRPM | OXYGEN SATURATION: 95 % | TEMPERATURE: 98.4 F | SYSTOLIC BLOOD PRESSURE: 108 MMHG

## 2021-09-20 DIAGNOSIS — Z20.822 SUSPECTED COVID-19 VIRUS INFECTION: Primary | ICD-10-CM

## 2021-09-20 LAB
ALBUMIN SERPL-MCNC: 3.4 G/DL (ref 3.4–5)
ALBUMIN/GLOB SERPL: 0.9 {RATIO} (ref 0.8–1.7)
ALP SERPL-CCNC: 80 U/L (ref 45–117)
ALT SERPL-CCNC: 14 U/L (ref 13–56)
ANION GAP SERPL CALC-SCNC: 5 MMOL/L (ref 3–18)
AST SERPL-CCNC: 11 U/L (ref 10–38)
BASOPHILS # BLD: 0.1 K/UL (ref 0–0.1)
BASOPHILS NFR BLD: 0 % (ref 0–2)
BILIRUB SERPL-MCNC: 0.6 MG/DL (ref 0.2–1)
BUN SERPL-MCNC: 9 MG/DL (ref 7–18)
BUN/CREAT SERPL: 11 (ref 12–20)
CALCIUM SERPL-MCNC: 8.3 MG/DL (ref 8.5–10.1)
CHLORIDE SERPL-SCNC: 107 MMOL/L (ref 100–111)
CO2 SERPL-SCNC: 28 MMOL/L (ref 21–32)
CREAT SERPL-MCNC: 0.84 MG/DL (ref 0.6–1.3)
D DIMER PPP FEU-MCNC: 0.63 UG/ML(FEU)
DIFFERENTIAL METHOD BLD: ABNORMAL
EOSINOPHIL # BLD: 0.1 K/UL (ref 0–0.4)
EOSINOPHIL NFR BLD: 1 % (ref 0–5)
ERYTHROCYTE [DISTWIDTH] IN BLOOD BY AUTOMATED COUNT: 13.4 % (ref 11.6–14.5)
GLOBULIN SER CALC-MCNC: 3.7 G/DL (ref 2–4)
GLUCOSE SERPL-MCNC: 110 MG/DL (ref 74–99)
HCG SERPL QL: NEGATIVE
HCT VFR BLD AUTO: 40.9 % (ref 35–45)
HGB BLD-MCNC: 14.4 G/DL (ref 12–16)
LIPASE SERPL-CCNC: 42 U/L (ref 73–393)
LYMPHOCYTES # BLD: 3.1 K/UL (ref 0.9–3.6)
LYMPHOCYTES NFR BLD: 23 % (ref 21–52)
MAGNESIUM SERPL-MCNC: 2.1 MG/DL (ref 1.6–2.6)
MCH RBC QN AUTO: 36 PG (ref 24–34)
MCHC RBC AUTO-ENTMCNC: 35.2 G/DL (ref 31–37)
MCV RBC AUTO: 102.3 FL (ref 78–100)
MONOCYTES # BLD: 0.7 K/UL (ref 0.05–1.2)
MONOCYTES NFR BLD: 5 % (ref 3–10)
NEUTS SEG # BLD: 9.5 K/UL (ref 1.8–8)
NEUTS SEG NFR BLD: 70 % (ref 40–73)
PLATELET # BLD AUTO: 261 K/UL (ref 135–420)
PMV BLD AUTO: 9.8 FL (ref 9.2–11.8)
POTASSIUM SERPL-SCNC: 3.1 MMOL/L (ref 3.5–5.5)
PROT SERPL-MCNC: 7.1 G/DL (ref 6.4–8.2)
RBC # BLD AUTO: 4 M/UL (ref 4.2–5.3)
SODIUM SERPL-SCNC: 140 MMOL/L (ref 136–145)
TROPONIN I SERPL-MCNC: <0.02 NG/ML (ref 0–0.04)
WBC # BLD AUTO: 13.5 K/UL (ref 4.6–13.2)

## 2021-09-20 PROCEDURE — 71275 CT ANGIOGRAPHY CHEST: CPT

## 2021-09-20 PROCEDURE — 85025 COMPLETE CBC W/AUTO DIFF WBC: CPT

## 2021-09-20 PROCEDURE — 85379 FIBRIN DEGRADATION QUANT: CPT

## 2021-09-20 PROCEDURE — 84703 CHORIONIC GONADOTROPIN ASSAY: CPT

## 2021-09-20 PROCEDURE — 83690 ASSAY OF LIPASE: CPT

## 2021-09-20 PROCEDURE — 74011000636 HC RX REV CODE- 636: Performed by: EMERGENCY MEDICINE

## 2021-09-20 PROCEDURE — 99284 EMERGENCY DEPT VISIT MOD MDM: CPT

## 2021-09-20 PROCEDURE — 84484 ASSAY OF TROPONIN QUANT: CPT

## 2021-09-20 PROCEDURE — 93005 ELECTROCARDIOGRAM TRACING: CPT

## 2021-09-20 PROCEDURE — 80053 COMPREHEN METABOLIC PANEL: CPT

## 2021-09-20 PROCEDURE — 83735 ASSAY OF MAGNESIUM: CPT

## 2021-09-20 RX ORDER — GUAIFENESIN 100 MG/5ML
81 LIQUID (ML) ORAL DAILY
Qty: 30 TABLET | Refills: 0 | Status: SHIPPED | OUTPATIENT
Start: 2021-09-20 | End: 2021-10-20

## 2021-09-20 RX ADMIN — IOPAMIDOL 80 ML: 755 INJECTION, SOLUTION INTRAVENOUS at 22:03

## 2021-09-21 NOTE — ED TRIAGE NOTES
Pt reports getting covid testing yesterday, pt does not have results back. Pt reports nausea, headache, cough, SOB (starting today). And fever since Friday.

## 2021-09-21 NOTE — ED NOTES
I have reviewed discharge instructions with the patient. The patient verbalized understanding. Patient armband removed and given to patient to take home. Patient was informed of the privacy risks if armband lost or stolen  Current Discharge Medication List      START taking these medications    Details   aspirin 81 mg chewable tablet Take 1 Tablet by mouth daily for 30 days.   Qty: 30 Tablet, Refills: 0  Start date: 9/20/2021, End date: 10/20/2021

## 2021-09-21 NOTE — ED PROVIDER NOTES
EMERGENCY DEPARTMENT HISTORY AND PHYSICAL EXAM    8:40 PM  Date: (Not on file)  Patient Name: Venessa Lezama    History of Presenting Illness     Chief Complaint   Patient presents with    Cough    Shortness of Breath    Fever    Headache    Chills        History Provided By: Patient    HPI: eVnessa Lezama is a 32 y.o. female with history of past medical problems as below. Patient is presenting flulike symptoms for 4 days, tested yesterday COVID-19, pending results. Patient states that she had persistent pleuritic chest pain since yesterday and exertional dyspnea. No previous history of VTE however she is on birth control pills. Location:  Severity:  Timing/course: Onset/Duration:     PCP: Other, MD Araseli    Past History     Past Medical History:  Past Medical History:   Diagnosis Date    ADHD     Alternating constipation and diarrhea     Anxiety     Anxiety and depression     Asthma     Chronic neck and back pain     Cough, persistent     Depression     Environmental allergies     GERD (gastroesophageal reflux disease)     H/O seasonal allergies     Heavy menses     Joint pain     Left-sided chest wall pain     Migraines     Panic attacks     Pelvic inflammatory disease     Pelvic pain     Piercing     bilateral tragus (unable to remove jewelry)    Post partum depression     Psychiatric problem     anxiety    Rash     under buttocks & inner thighs    Seizures ([address]     remote (only once)    SOB (shortness of breath)     Tattoos 2019    recent tattoo       Past Surgical History:  Past Surgical History:   Procedure Laterality Date    HX  SECTION  05/04/2018    x1    HX DILATION AND CURETTAGE  2019    HX OTHER SURGICAL      3rd nipple removed from left side.     HX PELVIC LAPAROSCOPY  2019    diagnostic laparoscopy with lysis of adhesions and ablation of endometriosis    HX WISDOM TEETH EXTRACTION         Family History:  Family History   Problem Relation Age of Onset   [facility] Mother         brain    Asthma Mother     Thyroid Disease Mother     No Known Problems Father     Asthma Maternal Grandmother     Lupus Maternal Grandmother        Social History:  Social History     Tobacco Use    Smoking status: Current Every Day Smoker    Smokeless tobacco: Never Used   Substance Use Topics    Alcohol use: Yes     Alcohol/week: 2.0 - 3.0 standard drinks     Types: 2 - 3 Shots of liquor per week     Comment: liquor or wine    Drug use: Yes     Types: Marijuana       Allergies: Allergies   Allergen Reactions    Adhesive Rash     BLISTERS AND IRRITATION WHERE ADHESIVES TOUCH SKIN    Adhesive Tape-Silicones Rash     Not allergic to silicone - ADHESIVE CAUSES SKIN IRRITATION    Egg White Other (comments)     GI distress       Review of Systems   Review of Systems   Constitutional: Positive for fatigue and fever. Respiratory: Positive for cough and shortness of breath. Cardiovascular: Positive for chest pain. Musculoskeletal: Positive for myalgias. Neurological: Positive for headaches. All other systems reviewed and are negative. Physical Exam     No data found. Physical Exam  Vitals and nursing note reviewed. Constitutional:       General: She is not in acute distress. HENT:      Head: Normocephalic and atraumatic. Eyes:      Extraocular Movements: Extraocular movements intact. Cardiovascular:      Rate and Rhythm: Normal rate. Pulmonary:      Effort: Pulmonary effort is normal. No respiratory distress. Musculoskeletal:         General: Normal range of motion. Cervical back: Normal range of motion and neck supple. Skin:     General: Skin is warm and dry. Neurological:      General: No focal deficit present. Mental Status: She is alert and oriented to person, place, and time.    Psychiatric:         Mood and Affect: Mood normal.         Diagnostic Study Results     Labs -  No results found for this or any previous visit (from the past 12 hour(s)). Radiologic Studies -   No results found. Medical Decision Making     ED Course: Progress Notes, Reevaluation, and Consults:    8:40 PM Initial assessment performed. The patients presenting problems have been discussed, and they/their family are in agreement with the care plan formulated and outlined with them. I have encouraged them to ask questions as they arise throughout their visit. Provider Notes (Medical Decision Making): 51-year-old female presenting with symptoms suggestive of COVID-19, pending testing. Pleuritic chest pain or shortness of breath. Well-appearing on exam not in distress. Vital signs are unremarkable. Screening labs ordered including cardiac work-up and D-dimer which was elevated. Subsequently CT of her chest was ordered which was negative for PE or pneumonia. Symptoms are likely related to bronchitis from Covid. Patient was provided with care instructions and strict return precautions. Also recommended daily aspirin and increase mobility. Procedures:     Critical Care Time:     Vital Signs-Reviewed the patient's vital signs. Reviewed pt's pulse ox reading. EKG: Interpreted by the EP. Time Interpreted:    Rate:    Rhythm: Normal Sinus Rhythm 87   Interpretation: Normal EKG   comparison: No significant interval changes    Records Reviewed: Nursing Notes, Old Medical Records and Previous electrocardiograms (Time of Review: 8:40 PM)  -I am the first provider for this patient.  -I reviewed the vital signs, available nursing notes, past medical history, past surgical history, family history and social history. Current Outpatient Medications   Medication Sig Dispense Refill    fluticasone/umeclidin/vilanter (TRELEGY ELLIPTA IN) Take 1 Puff by inhalation daily.  lurasidone (Latuda) 60 mg tab tablet Take  by mouth.  FLUoxetine (PROzac) 40 mg capsule Take 40 mg by mouth daily.       pseudoephedrine (Sudafed) 30 mg tablet Take 30 mg by mouth every six (6) hours as needed for Congestion.  PREDNISONE PO Take 3 Tablets by mouth daily.  L-Norgest&E Estradiol-E Estrad (Ashlyna) 0.15 mg-30 mcg (84)/10 mcg (7) 3MPk Ashlyna 0.15 mg-30 mcg (84)/10 mcg(7) tablets,3 month dose pack   1 tab po daily      albuterol (PROVENTIL HFA, VENTOLIN HFA, PROAIR HFA) 90 mcg/actuation inhaler Take 1-2 Puffs by inhalation every four (4) hours as needed for Wheezing. 1 Inhaler 0    lansoprazole (PREVACID) 30 mg capsule lansoprazole 30 mg capsule twice daily      montelukast (SINGULAIR) 10 mg tablet take 1 tablet by mouth at bedtime  0    traZODone (DESYREL) 50 mg tablet trazodone 50 mg tablet at bedtime      ALPRAZolam (XANAX) 0.5 mg tablet Take 0.5 mg by mouth three (3) times daily as needed for Anxiety. Clinical Impression     Clinical Impression: No diagnosis found. Disposition: dc        This note was dictated utilizing voice recognition software which may lead to typographical errors. I apologize in advance if the situation occurs. If questions arise please do not hesitate to contact me or call our department.     Malissa Watson MD  8:40 PM

## 2021-09-23 LAB
ATRIAL RATE: 87 BPM
CALCULATED P AXIS, ECG09: 64 DEGREES
CALCULATED R AXIS, ECG10: 83 DEGREES
CALCULATED T AXIS, ECG11: 9 DEGREES
DIAGNOSIS, 93000: NORMAL
P-R INTERVAL, ECG05: 136 MS
Q-T INTERVAL, ECG07: 366 MS
QRS DURATION, ECG06: 92 MS
QTC CALCULATION (BEZET), ECG08: 440 MS
VENTRICULAR RATE, ECG03: 87 BPM

## 2021-09-25 ENCOUNTER — HOSPITAL ENCOUNTER (EMERGENCY)
Age: 26
Discharge: HOME | End: 2021-09-25
Attending: EMERGENCY MEDICINE
Payer: COMMERCIAL

## 2021-09-25 VITALS
RESPIRATION RATE: 22 BRPM | TEMPERATURE: 98.6 F | OXYGEN SATURATION: 97 % | SYSTOLIC BLOOD PRESSURE: 113 MMHG | HEART RATE: 99 BPM | BODY MASS INDEX: 24.25 KG/M2 | DIASTOLIC BLOOD PRESSURE: 78 MMHG | HEIGHT: 68 IN | WEIGHT: 160 LBS

## 2021-09-25 DIAGNOSIS — Z20.822 SUSPECTED COVID-19 VIRUS INFECTION: Primary | ICD-10-CM

## 2021-09-25 LAB — SARS-COV-2, COV2: NORMAL

## 2021-09-25 PROCEDURE — U0003 INFECTIOUS AGENT DETECTION BY NUCLEIC ACID (DNA OR RNA); SEVERE ACUTE RESPIRATORY SYNDROME CORONAVIRUS 2 (SARS-COV-2) (CORONAVIRUS DISEASE [COVID-19]), AMPLIFIED PROBE TECHNIQUE, MAKING USE OF HIGH THROUGHPUT TECHNOLOGIES AS DESCRIBED BY CMS-2020-01-R: HCPCS

## 2021-09-25 PROCEDURE — 99282 EMERGENCY DEPT VISIT SF MDM: CPT

## 2021-09-25 RX ORDER — HYDROCODONE POLISTIREX AND CHLORPHENIRAMINE POLISTIREX 10; 8 MG/5ML; MG/5ML
5 SUSPENSION, EXTENDED RELEASE ORAL
Qty: 60 ML | Refills: 0 | Status: SHIPPED | OUTPATIENT
Start: 2021-09-25 | End: 2021-09-28

## 2021-09-25 RX ORDER — IBUPROFEN 600 MG/1
600 TABLET ORAL
Qty: 20 TABLET | Refills: 0 | Status: SHIPPED | OUTPATIENT
Start: 2021-09-25 | End: 2022-06-06

## 2021-09-25 RX ORDER — PSEUDOEPHEDRINE HCL 30 MG
60 TABLET ORAL
Qty: 30 TABLET | Refills: 0 | Status: SHIPPED | OUTPATIENT
Start: 2021-09-25 | End: 2022-06-06

## 2021-09-25 RX ORDER — PREDNISONE 50 MG/1
50 TABLET ORAL DAILY
Qty: 3 TABLET | Refills: 0 | Status: SHIPPED | OUTPATIENT
Start: 2021-09-25 | End: 2021-09-28

## 2021-09-25 RX ORDER — ONDANSETRON 4 MG/1
4 TABLET, ORALLY DISINTEGRATING ORAL
Qty: 20 TABLET | Refills: 0 | Status: SHIPPED | OUTPATIENT
Start: 2021-09-25 | End: 2022-06-06

## 2021-09-25 RX ORDER — FLUTICASONE PROPIONATE 50 MCG
2 SPRAY, SUSPENSION (ML) NASAL DAILY
Qty: 1 EACH | Refills: 0 | Status: SHIPPED | OUTPATIENT
Start: 2021-09-25

## 2021-09-25 NOTE — Clinical Note
[address] [facility] EMERGENCY DEPT  [id number] [address] [phone]    Work/School Note    Date: 9/25/2021     To Whom It May concern:    Margaret Torres was evaulated by the following provider(s):  Attending Provider: Livan Burgess MD  Physician Assistant: Aissatou Allen virus is suspected. Per the CDC guidelines we recommend home isolation until the following conditions are all met:    1. At least 10 days have passed since symptoms first appeared and  2. At least 24 hours have passed since last fever without the use of fever-reducing medications and  3.  Symptoms (e.g., cough, shortness of breath) have improved    Sincerely,          NATHAN Noriega

## 2021-09-25 NOTE — ED NOTES
Pt c/o covid symptoms that started the 17th. Had pcr testing that was negative    Has cough, headache, nasal congestion, diarrhea.

## 2021-09-25 NOTE — ED PROVIDER NOTES
EMERGENCY DEPARTMENT HISTORY AND PHYSICAL EXAM    Date: 9/25/2021  Patient Name: Yeison Vaz    History of Presenting Illness     Chief Complaint   Patient presents with    Concern For COVID-19 (Coronavirus)    Nausea         History Provided By:     Chief Complaint: cough, congestion, diarrhea, headache, decreased appetite, nausea, fatigue, loss of smell and taste   Duration: 1 week  Timing: Somewhat improving  Location: Diffuse body  Quality: Tired  Severity: Moderate  Modifying Factors: Some improvement with Tylenol  Associated Symptoms: none       Additional History (Context): Yeison Vaz is a 32 y.o. female with a history of asthma and anxiety presents today for issues listed above. Patient reports she has had 1 - Covid swab but reports concerns that a swab may not have been accurate. Patient has been taking Tylenol and Tessalon Perles at home without resolution of her symptoms. Reports that sleeping does help. Patient denies any chest pain, vomiting or abdominal pain. Denies any sore throat. PCP: Kiersten Andrew, DO    Current Outpatient Medications   Medication Sig Dispense Refill    pseudoephedrine (Sudafed) 30 mg tablet Take 2 Tablets by mouth every six (6) hours as needed for Congestion. 30 Tablet 0    ondansetron (Zofran ODT) 4 mg disintegrating tablet 1 Tablet by SubLINGual route every eight (8) hours as needed for Nausea or Vomiting. 20 Tablet 0    ibuprofen (MOTRIN) 600 mg tablet Take 1 Tablet by mouth every six (6) hours as needed for Pain. 20 Tablet 0    fluticasone propionate (FLONASE) 50 mcg/actuation nasal spray 2 Sprays by Both Nostrils route daily. 1 Each 0    HYDROcodone-chlorpheniramine (Tussionex Pennkinetic ER) 10-8 mg/5 mL suspension Take 5 mL by mouth every twelve (12) hours as needed for Cough for up to 3 days. Max Daily Amount: 10 mL. 60 mL 0    predniSONE (DELTASONE) 50 mg tablet Take 1 Tablet by mouth daily for 3 days.  3 Tablet 0    aspirin 81 mg chewable tablet Take 1 Tablet by mouth daily for 30 days. 30 Tablet 0    fluticasone/umeclidin/vilanter (TRELEGY ELLIPTA IN) Take 1 Puff by inhalation daily.  lurasidone (Latuda) 60 mg tab tablet Take  by mouth.  FLUoxetine (PROzac) 40 mg capsule Take 40 mg by mouth daily.  pseudoephedrine (Sudafed) 30 mg tablet Take 30 mg by mouth every six (6) hours as needed for Congestion.  PREDNISONE PO Take 3 Tablets by mouth daily.  L-Norgest&E Estradiol-E Estrad (Ashlyna) 0.15 mg-30 mcg (84)/10 mcg (7) 3MPk Ashlyna 0.15 mg-30 mcg (84)/10 mcg(7) tablets,3 month dose pack   1 tab po daily      albuterol (PROVENTIL HFA, VENTOLIN HFA, PROAIR HFA) 90 mcg/actuation inhaler Take 1-2 Puffs by inhalation every four (4) hours as needed for Wheezing. 1 Inhaler 0    lansoprazole (PREVACID) 30 mg capsule lansoprazole 30 mg capsule twice daily      montelukast (SINGULAIR) 10 mg tablet take 1 tablet by mouth at bedtime  0    traZODone (DESYREL) 50 mg tablet trazodone 50 mg tablet at bedtime      ALPRAZolam (XANAX) 0.5 mg tablet Take 0.5 mg by mouth three (3) times daily as needed for Anxiety.          Past History     Past Medical History:  Past Medical History:   Diagnosis Date    ADHD     Alternating constipation and diarrhea     Anxiety     Anxiety and depression     Asthma     Chronic neck and back pain     Cough, persistent     Depression     Environmental allergies     GERD (gastroesophageal reflux disease)     H/O seasonal allergies     Heavy menses     Joint pain     Left-sided chest wall pain     Migraines     Panic attacks     Pelvic inflammatory disease     Pelvic pain     Piercing     bilateral tragus (unable to remove jewelry)    Post partum depression     Psychiatric problem     anxiety    Rash     under buttocks & inner thighs    Seizures ([address] 2011    remote (only once)    SOB (shortness of breath)     Tattoos 05/2019    recent tattoo       Past Surgical History:  Past Surgical History:   Procedure Laterality Date    HX  SECTION  05/04/2018    x1    HX DILATION AND CURETTAGE  2019    HX OTHER SURGICAL      3rd nipple removed from left side.  HX PELVIC LAPAROSCOPY  2019    diagnostic laparoscopy with lysis of adhesions and ablation of endometriosis    HX WISDOM TEETH EXTRACTION         Family History:  Family History   Problem Relation Age of Onset   Carolyn Hood Cancer Mother         brain    Asthma Mother     Thyroid Disease Mother     No Known Problems Father     Asthma Maternal Grandmother     Lupus Maternal Grandmother        Social History:  Social History     Tobacco Use    Smoking status: Current Every Day Smoker    Smokeless tobacco: Never Used   Substance Use Topics    Alcohol use: Yes     Alcohol/week: 2.0 - 3.0 standard drinks     Types: 2 - 3 Shots of liquor per week     Comment: liquor or wine    Drug use: Yes     Types: Marijuana       Allergies: Allergies   Allergen Reactions    Adhesive Rash     BLISTERS AND IRRITATION WHERE ADHESIVES TOUCH SKIN    Adhesive Tape-Silicones Rash     Not allergic to silicone - ADHESIVE CAUSES SKIN IRRITATION    Egg White Other (comments)     GI distress         Review of Systems   Review of Systems   Constitutional: Positive for appetite change, chills and fatigue. Negative for fever. HENT: Positive for congestion, rhinorrhea and sneezing. Negative for sinus pressure, sinus pain and sore throat. Respiratory: Positive for cough. Negative for shortness of breath. Cardiovascular: Negative for chest pain. Gastrointestinal: Positive for nausea. Negative for abdominal pain, blood in stool, constipation, diarrhea and vomiting. Genitourinary: Negative for dysuria, frequency and hematuria. Musculoskeletal: Positive for myalgias. Negative for back pain. Skin: Negative for rash and wound. Neurological: Positive for headaches. Negative for dizziness. All other systems reviewed and are negative.     All Other Systems Negative  Physical Exam     Vitals:    09/25/21 1421 09/25/21 1432   BP: 113/78    Pulse: 99    Resp: 22    Temp: 98.6 °F (37 °C)    SpO2: 97% 97%   Weight: 72.6 kg (160 lb)    Height: 5' 8\" (1.727 m)      Physical Exam  Vitals and nursing note reviewed. Constitutional:       General: She is not in acute distress. Appearance: She is well-developed. She is not diaphoretic. Comments: Overall well-appearing   HENT:      Head: Normocephalic and atraumatic. Eyes:      Conjunctiva/sclera: Conjunctivae normal.   Cardiovascular:      Rate and Rhythm: Normal rate and regular rhythm. Heart sounds: Normal heart sounds. Pulmonary:      Effort: Pulmonary effort is normal. No respiratory distress. Breath sounds: Normal breath sounds. No stridor, decreased air movement or transmitted upper airway sounds. No decreased breath sounds or wheezing. Comments: Lung sounds are clear and equal bilaterally, 97% on room air, no respiratory distress, dry coughing on exam  Chest:      Chest wall: No tenderness. Abdominal:      General: Bowel sounds are normal. There is no distension. Palpations: Abdomen is soft. Tenderness: There is no abdominal tenderness. There is no guarding or rebound. Musculoskeletal:         General: No deformity. Cervical back: Normal range of motion and neck supple. Skin:     General: Skin is warm and dry. Neurological:      Mental Status: She is alert and oriented to person, place, and time. Deep Tendon Reflexes: Reflexes are normal and symmetric.            Diagnostic Study Results     Labs -     Recent Results (from the past 12 hour(s))   SARS-COV-2    Collection Time: 09/25/21  2:21 PM   Result Value Ref Range    SARS-CoV-2 Nasopharyngeal         Radiologic Studies -   No orders to display     CT Results  (Last 48 hours)    None        CXR Results  (Last 48 hours)    None            Medical Decision Making   I am the first provider for this patient. I reviewed the vital signs, available nursing notes, past medical history, past surgical history, family history and social history. Vital Signs-Reviewed the patient's vital signs. Records Reviewed: Nursing Notes and Old Medical Records     Procedures: None   Procedures    Provider Notes (Medical Decision Making):     Differential Diagnosis:  influenza, mononucleosis, acute bronchitis, URI, streptococcal pharyngitis, pneumonia, asthma exacerbation, allergic rhinitis, seasonal allergies, COVID    Plan: We will reswab for Covid. Will discharge home with Tussionex, Sudafed and short course of steroids. Also discharge home with Motrin to alternate with Tylenol. Have discussed I do not feel patient requires dose of antibiotics at this time however have discussed chance it may progress into something that may require antibiotics later on. Patient agrees and states understanding. Have discussed symptoms most likely viral in nature and may last up to 2 weeks. Will give work note. Have stressed importance of hydration rest and home isolation. Will discharge home      MED RECONCILIATION:  No current facility-administered medications for this encounter. Current Outpatient Medications   Medication Sig    pseudoephedrine (Sudafed) 30 mg tablet Take 2 Tablets by mouth every six (6) hours as needed for Congestion.  ondansetron (Zofran ODT) 4 mg disintegrating tablet 1 Tablet by SubLINGual route every eight (8) hours as needed for Nausea or Vomiting.  ibuprofen (MOTRIN) 600 mg tablet Take 1 Tablet by mouth every six (6) hours as needed for Pain.  fluticasone propionate (FLONASE) 50 mcg/actuation nasal spray 2 Sprays by Both Nostrils route daily.  HYDROcodone-chlorpheniramine (Tussionex Pennkinetic ER) 10-8 mg/5 mL suspension Take 5 mL by mouth every twelve (12) hours as needed for Cough for up to 3 days. Max Daily Amount: 10 mL.     predniSONE (DELTASONE) 50 mg tablet Take 1 Tablet by mouth daily for 3 days.  aspirin 81 mg chewable tablet Take 1 Tablet by mouth daily for 30 days.  fluticasone/umeclidin/vilanter (TRELEGY ELLIPTA IN) Take 1 Puff by inhalation daily.  lurasidone (Latuda) 60 mg tab tablet Take  by mouth.  FLUoxetine (PROzac) 40 mg capsule Take 40 mg by mouth daily.  pseudoephedrine (Sudafed) 30 mg tablet Take 30 mg by mouth every six (6) hours as needed for Congestion.  PREDNISONE PO Take 3 Tablets by mouth daily.  L-Norgest&E Estradiol-E Estrad (Ashlyna) 0.15 mg-30 mcg (84)/10 mcg (7) 3MPk Ashlyna 0.15 mg-30 mcg (84)/10 mcg(7) tablets,3 month dose pack   1 tab po daily    albuterol (PROVENTIL HFA, VENTOLIN HFA, PROAIR HFA) 90 mcg/actuation inhaler Take 1-2 Puffs by inhalation every four (4) hours as needed for Wheezing.  lansoprazole (PREVACID) 30 mg capsule lansoprazole 30 mg capsule twice daily    montelukast (SINGULAIR) 10 mg tablet take 1 tablet by mouth at bedtime    traZODone (DESYREL) 50 mg tablet trazodone 50 mg tablet at bedtime    ALPRAZolam (XANAX) 0.5 mg tablet Take 0.5 mg by mouth three (3) times daily as needed for Anxiety. Disposition:  Home     DISCHARGE NOTE:   Pt has been reexamined. Patient has no new complaints, changes, or physical findings. Care plan outlined and precautions discussed. Results of workup were reviewed with the patient. All medications were reviewed with the patient. All of pt's questions and concerns were addressed. Patient was instructed and agrees to follow up with PCP as well as to return to the ED upon further deterioration. Patient is ready to go home.     Follow-up Information     Follow up With Specialties Details Why Contact Info    [facility] EMERGENCY DEPT Emergency Medicine  As needed [address] [facility]  [phone]    Era Ruano DO Family Medicine Schedule an appointment as soon as possible for a visit   Conner Thomas [address] [place] [address] [address] [facility] [address]  [phone]            Current Discharge Medication List      START taking these medications    Details   ! ! pseudoephedrine (Sudafed) 30 mg tablet Take 2 Tablets by mouth every six (6) hours as needed for Congestion. Qty: 30 Tablet, Refills: 0  Start date: 9/25/2021      ondansetron (Zofran ODT) 4 mg disintegrating tablet 1 Tablet by SubLINGual route every eight (8) hours as needed for Nausea or Vomiting. Qty: 20 Tablet, Refills: 0  Start date: 9/25/2021      ibuprofen (MOTRIN) 600 mg tablet Take 1 Tablet by mouth every six (6) hours as needed for Pain. Qty: 20 Tablet, Refills: 0  Start date: 9/25/2021      fluticasone propionate (FLONASE) 50 mcg/actuation nasal spray 2 Sprays by Both Nostrils route daily. Qty: 1 Each, Refills: 0  Start date: 9/25/2021      HYDROcodone-chlorpheniramine (Tussionex Pennkinetic ER) 10-8 mg/5 mL suspension Take 5 mL by mouth every twelve (12) hours as needed for Cough for up to 3 days. Max Daily Amount: 10 mL. Qty: 60 mL, Refills: 0  Start date: 9/25/2021, End date: 9/28/2021    Associated Diagnoses: Suspected COVID-19 virus infection      ! ! predniSONE (DELTASONE) 50 mg tablet Take 1 Tablet by mouth daily for 3 days. Qty: 3 Tablet, Refills: 0  Start date: 9/25/2021, End date: 9/28/2021       !! - Potential duplicate medications found. Please discuss with provider. CONTINUE these medications which have NOT CHANGED    Details   ! ! pseudoephedrine (Sudafed) 30 mg tablet Take 30 mg by mouth every six (6) hours as needed for Congestion. !! PREDNISONE PO Take 3 Tablets by mouth daily. !! - Potential duplicate medications found. Please discuss with provider. Diagnosis     Clinical Impression:   1. Suspected COVID-19 virus infection          \"Please note that this dictation was completed with [employer], the [employer] voice recognition software.  Quite often unanticipated grammatical, syntax, homophones, and other interpretive errors are inadvertently transcribed by the computer software. Please disregard these errors. Please excuse any errors that have escaped final proofreading. \"

## 2021-09-27 ENCOUNTER — PATIENT OUTREACH (OUTPATIENT)
Dept: CASE MANAGEMENT | Age: 26
End: 2021-09-27

## 2021-09-27 NOTE — PROGRESS NOTES
Date/Time:  9/27/2021 1:37 PM   Call within 2 business days of discharge: Yes   Attempted to reach patient by telephone. Unable to leave HIPPA compliant message requesting a return call. VM Full or not set up as of yet. Will attempt to reach patient again.

## 2021-09-28 ENCOUNTER — PATIENT OUTREACH (OUTPATIENT)
Dept: CASE MANAGEMENT | Age: 26
End: 2021-09-28

## 2021-09-28 LAB — SARS-COV-2, NAA: NOT DETECTED

## 2021-09-28 NOTE — PROGRESS NOTES
Patient contacted regarding [id number] diagnosis\". Discussed COVID-19 related testing which was available at this time. Test results were negative. Patient informed of results, if available? yes     Ambulatory Care Manager contacted the patient by telephone to perform post discharge assessment. Call within 2 business days of discharge: Yes Verified name and  with patient as identifiers. Provided introduction to self, and explanation of the CTN/ACM role, and reason for call due to risk factors for infection and/or exposure to COVID-19. Symptoms reviewed with patient who verbalized the following symptoms: no new symptoms      Due to no new or worsening symptoms encounter was not routed to provider for escalation. Discussed follow-up appointments. If no appointment was previously scheduled, appointment scheduling offered:  yes   [facility] follow up appointment(s): No future appointments. Advance Care Planning:   Does patient have an Advance Directive:  reviewed and current. Patient has following risk factors of: asthma. ACM reviewed discharge instructions, medical action plan and red flags such as increased shortness of breath, increasing fever and signs of decompensation with patient who verbalized understanding. Discussed exposure protocols and quarantine with CDC Guidelines What to do if you are sick with coronavirus disease .  Patient was given an opportunity for questions and concerns. The patient agrees to contact the [facility] exposure line [phone], [facility]  [phone] and PCP office for questions related to their healthcare. ACM provided contact information for future needs. Reviewed and educated patient on any new and changed medications related to discharge diagnosis     Was patient discharged with a pulse oximeter?  NO    Patient resolved from Transition of Care episode on  2021  Patient/family has been provided the following resources and education related to COVID-19:                         Signs, symptoms and red flags related to COVID-19            CDC exposure and quarantine guidelines            [facility] exposure contact - [phone]            Contact for their local Department of Health                 Patient currently reports that the following symptoms have improved:  no new symptoms. No further outreach scheduled with this CTN/ACM. Episode of Care resolved. Patient has this CTN/ACM contact information if future needs arise.

## 2021-11-25 ENCOUNTER — HOSPITAL ENCOUNTER (EMERGENCY)
Age: 26
Discharge: HOME | End: 2021-11-26
Attending: EMERGENCY MEDICINE
Payer: COMMERCIAL

## 2021-11-25 DIAGNOSIS — M54.9 BACK PAIN, UNSPECIFIED BACK LOCATION, UNSPECIFIED BACK PAIN LATERALITY, UNSPECIFIED CHRONICITY: Primary | ICD-10-CM
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
## 2021-11-25 DIAGNOSIS — M54.2 NECK PAIN: ICD-10-CM

## 2021-11-25 PROCEDURE — 99283 EMERGENCY DEPT VISIT LOW MDM: CPT

## 2021-11-26 ENCOUNTER — APPOINTMENT (OUTPATIENT)
Dept: CT IMAGING | Age: 26
End: 2021-11-26
Attending: EMERGENCY MEDICINE
Payer: COMMERCIAL

## 2021-11-26 VITALS
SYSTOLIC BLOOD PRESSURE: 132 MMHG | RESPIRATION RATE: 16 BRPM | TEMPERATURE: 97.9 F | BODY MASS INDEX: 27.31 KG/M2 | WEIGHT: 160 LBS | OXYGEN SATURATION: 100 % | DIASTOLIC BLOOD PRESSURE: 90 MMHG | HEIGHT: 64 IN | HEART RATE: 88 BPM

## 2021-11-26 PROCEDURE — 74011250637 HC RX REV CODE- 250/637: Performed by: EMERGENCY MEDICINE

## 2021-11-26 PROCEDURE — 72125 CT NECK SPINE W/O DYE: CPT

## 2021-11-26 PROCEDURE — 70450 CT HEAD/BRAIN W/O DYE: CPT

## 2021-11-26 RX ORDER — LIDOCAINE 4 G/100G
PATCH TOPICAL
Qty: 10 PATCH | Refills: 0 | Status: SHIPPED | OUTPATIENT
Start: 2021-11-26 | End: 2022-06-06

## 2021-11-26 RX ORDER — NAPROXEN 500 MG/1
500 TABLET ORAL 2 TIMES DAILY WITH MEALS
Qty: 6 TABLET | Refills: 0 | Status: SHIPPED | OUTPATIENT
Start: 2021-11-26 | End: 2021-11-29

## 2021-11-26 RX ORDER — NAPROXEN 250 MG/1
500 TABLET ORAL ONCE
Status: COMPLETED | OUTPATIENT
Start: 2021-11-26 | End: 2021-11-26

## 2021-11-26 RX ORDER — CYCLOBENZAPRINE HCL 5 MG
10 TABLET ORAL 3 TIMES DAILY
Qty: 18 TABLET | Refills: 0 | Status: SHIPPED | OUTPATIENT
Start: 2021-11-26 | End: 2021-11-29

## 2021-11-26 RX ORDER — ACETAMINOPHEN 500 MG
1000 TABLET ORAL
Status: COMPLETED | OUTPATIENT
Start: 2021-11-26 | End: 2021-11-26

## 2021-11-26 RX ADMIN — NAPROXEN 500 MG: 250 TABLET ORAL at 01:06

## 2021-11-26 RX ADMIN — ACETAMINOPHEN 1000 MG: 500 TABLET ORAL at 00:06
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
## 2021-11-26 NOTE — ED NOTES
Pt feeling much better. I have reviewed discharge instructions with the patient. The patient verbalized understanding. Patient armband removed and given to patient to take home. Patient was informed of the privacy risks if armband lost or stolen  Current Discharge Medication List      START taking these medications    Details   naproxen (Naprosyn) 500 mg tablet Take 1 Tablet by mouth two (2) times daily (with meals) for 3 days. Qty: 6 Tablet, Refills: 0  Start date: 11/26/2021, End date: 11/29/2021      lidocaine 4 % patch Apply to area of pain one every 12 hours  Qty: 10 Patch, Refills: 0  Start date: 11/26/2021      cyclobenzaprine (FLEXERIL) 5 mg tablet Take 2 Tablets by mouth three (3) times daily for 3 days.   Qty: 18 Tablet, Refills: 0  Start date: 11/26/2021, End date: 11/29/2021
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
## 2022-03-25 ENCOUNTER — HOSPITAL ENCOUNTER (EMERGENCY)
Age: 27
Discharge: HOME | End: 2022-03-26
Attending: EMERGENCY MEDICINE
Payer: COMMERCIAL

## 2022-03-25 DIAGNOSIS — N93.9 ABNORMAL VAGINAL BLEEDING: Primary | ICD-10-CM

## 2022-03-25 DIAGNOSIS — N39.0 URINARY TRACT INFECTION WITHOUT HEMATURIA, SITE UNSPECIFIED: ICD-10-CM

## 2022-03-25 LAB
APPEARANCE UR: CLEAR
APTT PPP: 29.8 SEC (ref 23–36.4)
BACTERIA URNS QL MICRO: ABNORMAL /HPF
BILIRUB UR QL: NEGATIVE
COLOR UR: YELLOW
EPITH CASTS URNS QL MICRO: ABNORMAL /LPF (ref 0–5)
GLUCOSE UR STRIP.AUTO-MCNC: NEGATIVE MG/DL
HCG UR QL: NEGATIVE
HGB UR QL STRIP: ABNORMAL
INR PPP: 1 (ref 0.8–1.2)
KETONES UR QL STRIP.AUTO: NEGATIVE MG/DL
LEUKOCYTE ESTERASE UR QL STRIP.AUTO: ABNORMAL
MUCOUS THREADS URNS QL MICRO: ABNORMAL /LPF
NITRITE UR QL STRIP.AUTO: NEGATIVE
PH UR STRIP: 6.5 [PH] (ref 5–8)
PROT UR STRIP-MCNC: NEGATIVE MG/DL
PROTHROMBIN TIME: 13.2 SEC (ref 11.5–15.2)
RBC #/AREA URNS HPF: ABNORMAL /HPF (ref 0–5)
SP GR UR REFRACTOMETRY: 1.01 (ref 1–1.03)
UROBILINOGEN UR QL STRIP.AUTO: 1 EU/DL (ref 0.2–1)
WBC URNS QL MICRO: ABNORMAL /HPF (ref 0–4)

## 2022-03-25 PROCEDURE — 80053 COMPREHEN METABOLIC PANEL: CPT

## 2022-03-25 PROCEDURE — 85610 PROTHROMBIN TIME: CPT

## 2022-03-25 PROCEDURE — 81025 URINE PREGNANCY TEST: CPT

## 2022-03-25 PROCEDURE — 85730 THROMBOPLASTIN TIME PARTIAL: CPT

## 2022-03-25 PROCEDURE — 81001 URINALYSIS AUTO W/SCOPE: CPT

## 2022-03-25 PROCEDURE — 85025 COMPLETE CBC W/AUTO DIFF WBC: CPT

## 2022-03-25 PROCEDURE — 99283 EMERGENCY DEPT VISIT LOW MDM: CPT

## 2022-03-26 VITALS
DIASTOLIC BLOOD PRESSURE: 75 MMHG | HEART RATE: 82 BPM | WEIGHT: 155 LBS | OXYGEN SATURATION: 98 % | SYSTOLIC BLOOD PRESSURE: 138 MMHG | TEMPERATURE: 97.8 F | RESPIRATION RATE: 18 BRPM | BODY MASS INDEX: 23.49 KG/M2 | HEIGHT: 68 IN

## 2022-03-26 LAB
ALBUMIN SERPL-MCNC: 3.6 G/DL (ref 3.4–5)
ALBUMIN/GLOB SERPL: 1 {RATIO} (ref 0.8–1.7)
ALP SERPL-CCNC: 67 U/L (ref 45–117)
ALT SERPL-CCNC: 16 U/L (ref 13–56)
ANION GAP SERPL CALC-SCNC: 4 MMOL/L (ref 3–18)
AST SERPL-CCNC: 14 U/L (ref 10–38)
BASOPHILS # BLD: 0.1 K/UL (ref 0–0.1)
BASOPHILS NFR BLD: 1 % (ref 0–2)
BILIRUB SERPL-MCNC: 0.5 MG/DL (ref 0.2–1)
BUN SERPL-MCNC: 6 MG/DL (ref 7–18)
BUN/CREAT SERPL: 9 (ref 12–20)
CALCIUM SERPL-MCNC: 8.5 MG/DL (ref 8.5–10.1)
CHLORIDE SERPL-SCNC: 108 MMOL/L (ref 100–111)
CO2 SERPL-SCNC: 27 MMOL/L (ref 21–32)
CREAT SERPL-MCNC: 0.67 MG/DL (ref 0.6–1.3)
DIFFERENTIAL METHOD BLD: ABNORMAL
EOSINOPHIL # BLD: 0.2 K/UL (ref 0–0.4)
EOSINOPHIL NFR BLD: 2 % (ref 0–5)
ERYTHROCYTE [DISTWIDTH] IN BLOOD BY AUTOMATED COUNT: 12.2 % (ref 11.6–14.5)
GLOBULIN SER CALC-MCNC: 3.7 G/DL (ref 2–4)
GLUCOSE SERPL-MCNC: 75 MG/DL (ref 74–99)
HCT VFR BLD AUTO: 42.7 % (ref 35–45)
HGB BLD-MCNC: 14.8 G/DL (ref 12–16)
IMM GRANULOCYTES # BLD AUTO: 0 K/UL
IMM GRANULOCYTES NFR BLD AUTO: 0 %
LYMPHOCYTES # BLD: 6 K/UL (ref 0.9–3.6)
LYMPHOCYTES NFR BLD: 54 % (ref 21–52)
MCH RBC QN AUTO: 34.7 PG (ref 24–34)
MCHC RBC AUTO-ENTMCNC: 34.7 G/DL (ref 31–37)
MCV RBC AUTO: 100.2 FL (ref 78–100)
MONOCYTES # BLD: 0.1 K/UL (ref 0.05–1.2)
MONOCYTES NFR BLD: 1 % (ref 3–10)
NEUTS SEG # BLD: 4.7 K/UL (ref 1.8–8)
NEUTS SEG NFR BLD: 42 % (ref 40–73)
NRBC # BLD: 0 K/UL (ref 0–0.01)
NRBC BLD-RTO: 0 PER 100 WBC
PLATELET # BLD AUTO: 301 K/UL (ref 135–420)
PLATELET COMMENTS,PCOM: ABNORMAL
PMV BLD AUTO: 9.7 FL (ref 9.2–11.8)
POTASSIUM SERPL-SCNC: 3.4 MMOL/L (ref 3.5–5.5)
PROT SERPL-MCNC: 7.3 G/DL (ref 6.4–8.2)
RBC # BLD AUTO: 4.26 M/UL (ref 4.2–5.3)
RBC MORPH BLD: ABNORMAL
SODIUM SERPL-SCNC: 139 MMOL/L (ref 136–145)
WBC # BLD AUTO: 11.1 K/UL (ref 4.6–13.2)

## 2022-03-26 PROCEDURE — 74011250637 HC RX REV CODE- 250/637: Performed by: EMERGENCY MEDICINE

## 2022-03-26 RX ORDER — SULFAMETHOXAZOLE AND TRIMETHOPRIM 800; 160 MG/1; MG/1
1 TABLET ORAL
Status: COMPLETED | OUTPATIENT
Start: 2022-03-26 | End: 2022-03-26

## 2022-03-26 RX ORDER — SULFAMETHOXAZOLE AND TRIMETHOPRIM 800; 160 MG/1; MG/1
1 TABLET ORAL 2 TIMES DAILY
Qty: 14 TABLET | Refills: 0 | Status: SHIPPED | OUTPATIENT
Start: 2022-03-26 | End: 2022-04-02

## 2022-03-26 RX ADMIN — SULFAMETHOXAZOLE AND TRIMETHOPRIM 1 TABLET: 800; 160 TABLET ORAL at 00:57

## 2022-03-26 NOTE — ED PROVIDER NOTES
HPI is a 55-year-old female with a history of endometriosis. She takes birth control pills and presents to ER with complaint having intermittent vaginal bleeding for the past 3 months. For the past 3 days vaginal bleeding is heavier than normal.  No fever chills shortness of breath. She has history of abnormal vaginal bleeding in the past and has been having. Past Medical History:   Diagnosis Date    ADHD     Alternating constipation and diarrhea     Anxiety     Anxiety and depression     Asthma     Chronic cough     Chronic neck and back pain     Cough, persistent     Depression     Environmental allergies     Excessive daytime sleepiness     GERD (gastroesophageal reflux disease)     GERD (gastroesophageal reflux disease)     H/O seasonal allergies     Heavy menses     Joint pain     Left-sided chest wall pain     Marijuana use     Migraines     Panic attacks     Pelvic inflammatory disease     Pelvic pain     Piercing     bilateral tragus (unable to remove jewelry)    Post partum depression     Psychiatric problem     anxiety    Rash     under buttocks & inner thighs    Seizures ([address]     remote (only once)    SOB (shortness of breath)     Suicidal ideations     Tattoos 2019    recent tattoo       Past Surgical History:   Procedure Laterality Date    HX  SECTION  05/04/2018    x1    HX DILATION AND CURETTAGE  2019    HX OTHER SURGICAL      3rd nipple removed from left side.     HX PELVIC LAPAROSCOPY  2019    diagnostic laparoscopy with lysis of adhesions and ablation of endometriosis    HX WISDOM TEETH EXTRACTION           Family History:   Problem Relation Age of Onset   [facility] Cancer Mother         brain    Asthma Mother     Thyroid Disease Mother     No Known Problems Father     Asthma Maternal Grandmother     Lupus Maternal Grandmother        Social History     Socioeconomic History    Marital status: SINGLE     Spouse name: Not on file    Number of children: Not on file    Years of education: Not on file    Highest education level: Not on file   Occupational History    Not on file   Tobacco Use    Smoking status: Current Every Day Smoker    Smokeless tobacco: Never Used   Substance and Sexual Activity    Alcohol use: Yes     Alcohol/week: 2.0 - 3.0 standard drinks     Types: 2 - 3 Shots of liquor per week     Comment: liquor or wine    Drug use: Yes     Types: Marijuana    Sexual activity: Not on file   Other Topics Concern    Not on file   Social History Narrative    Not on file     Social Determinants of Health     Financial Resource Strain:     Difficulty of Paying Living Expenses: Not on file   Food Insecurity:     Worried About Running Out of Food in the Last Year: Not on file    [place] of Food in the Last Year: Not on file   Transportation Needs:     Lack of Transportation (Medical): Not on file    Lack of Transportation (Non-Medical): Not on file   Physical Activity:     Days of Exercise per Week: Not on file    Minutes of Exercise per Session: Not on file   Stress:     Feeling of Stress : Not on file   Social Connections:     Frequency of Communication with Friends and Family: Not on file    Frequency of Social Gatherings with Friends and Family: Not on file    Attends [employer] Services: Not on file    Active Member of [address] [place] [address] [employer] or Organizations: Not on file    Attends Club or Organization Meetings: Not on file    Marital Status: Not on file   Intimate Partner Violence:     Fear of Current or Ex-Partner: Not on file    Emotionally Abused: Not on file    Physically Abused: Not on file    Sexually Abused: Not on file   Housing Stability:     Unable to Pay for Housing in the Last Year: Not on file    Number of [place] in the Last Year: Not on file    Unstable Housing in the Last Year: Not on file         ALLERGIES: Adhesive, Adhesive tape-silicones, and Egg white    Review of Systems   Constitutional: Negative. HENT: Negative. Eyes: Negative. Respiratory: Negative. Cardiovascular: Negative. Gastrointestinal: Negative. Endocrine: Negative. Genitourinary: Positive for menstrual problem and vaginal bleeding. Musculoskeletal: Negative. Skin: Negative. Allergic/Immunologic: Negative. Neurological: Negative. Hematological: Negative. Psychiatric/Behavioral: Negative. All other systems reviewed and are negative. Vitals:    03/25/22 2213   BP: 137/87   Pulse: 82   Resp: 18   Temp: 97.8 °F (36.6 °C)   SpO2: 97%   Weight: 70.3 kg (155 lb)   Height: 5' 8\" (1.727 m)            Physical Exam  Vitals and nursing note reviewed. Constitutional:       General: She is not in acute distress. Appearance: She is well-developed. She is not diaphoretic. HENT:      Head: Normocephalic. Right Ear: External ear normal.      Left Ear: External ear normal.      Mouth/Throat:      Pharynx: No oropharyngeal exudate. Eyes:      General: No scleral icterus. Right eye: No discharge. Left eye: No discharge. Conjunctiva/sclera: Conjunctivae normal.      Pupils: Pupils are equal, round, and reactive to light. Neck:      Thyroid: No thyromegaly. Vascular: No JVD. Trachea: No tracheal deviation. Cardiovascular:      Rate and Rhythm: Normal rate and regular rhythm. Heart sounds: Normal heart sounds. No murmur heard. No friction rub. No gallop. Pulmonary:      Effort: Pulmonary effort is normal. No respiratory distress. Breath sounds: Normal breath sounds. No stridor. No wheezing or rales. Chest:      Chest wall: No tenderness. Abdominal:      General: Bowel sounds are normal. There is no distension. Palpations: Abdomen is soft. There is no mass. Tenderness: There is no abdominal tenderness. There is no guarding or rebound. Musculoskeletal:         General: No tenderness. Normal range of motion.       Cervical back: Normal range of motion and neck supple. Lymphadenopathy:      Cervical: No cervical adenopathy. Skin:     General: Skin is warm and dry. Coloration: Skin is not pale. Findings: No erythema or rash. Neurological:      Mental Status: She is alert and oriented to person, place, and time. Cranial Nerves: No cranial nerve deficit. Motor: No abnormal muscle tone. Coordination: Coordination normal.      Deep Tendon Reflexes: Reflexes normal.          [facility]       Pelvic Exam    Date/Time: 3/25/2022 11:25 PM  Performed by: attending  Type of exam performed: bimanual and speculum. External genitalia appearance: normal.    Vaginal exam:  bleeding. Bleeding: mild  Cervical exam:  normal.    Specimen(s) collected:  none. Bimanual exam:  normal.    Patient tolerance: patient tolerated the procedure well with no immediate complications      Differential diagnosis: Abnormal vaginal bleeding, endometriosis, pregnancy, anemia,    Lab tests: Interpreted by me    Urinalysis: Interpreted by me    Dx: Metromenorrhagia, UTI    Disp: : DC home. Follow-up GYN in a week. Motion antibiotics every 6 as needed pain. Return ER needed    Dictation disclaimer:  Please note that this dictation was completed with [employer], the [employer] voice recognition software. Quite often unanticipated grammatical, syntax, homophones, and other interpretive errors are inadvertently transcribed by the computer software. Please disregard these errors. Please excuse any errors that have escaped final proofreading.

## 2022-03-26 NOTE — ED NOTES
Pt arrived to ED Room: 1  Introduced self to Patient, Assessment completed,reviewed pt concerns, monitor connected call bell given to patient, will continue to monitor pending MD assessment & orders to be placed.

## 2022-03-26 NOTE — ED TRIAGE NOTES
A&O female with c/o vaginal bleeding and cramping. Reports taking continuous birth control for endometriosis and having some breakthrough spotting x 2 months. Bleeding becoming heavier over the past 2 weeks.

## 2022-03-26 NOTE — ED NOTES
I have reviewed discharge instructions with the patient. The patient verbalized understanding. Discharge medications reviewed with patient and appropriate educational materials and side effects teaching were provided. Patient armband removed and given to patient to take home. Patient was informed of the privacy risks if armband lost or stolen    Visit Vitals  /75   Pulse 82   Temp 97.8 °F (36.6 °C)   Resp 18   Ht 5' 8\" (1.727 m)   Wt 70.3 kg (155 lb)   SpO2 98%   BMI 23.57 kg/m²       ICD-10-CM ICD-9-CM    1. Abnormal vaginal bleeding  N93.9 623.8    2.  Urinary tract infection without hematuria, site unspecified  N39.0 599.0

## 2022-05-31 ENCOUNTER — HOSPITAL ENCOUNTER (EMERGENCY)
Age: 27
Discharge: HOME | End: 2022-05-31
Attending: STUDENT IN AN ORGANIZED HEALTH CARE EDUCATION/TRAINING PROGRAM
Payer: MEDICAID

## 2022-05-31 VITALS
DIASTOLIC BLOOD PRESSURE: 99 MMHG | SYSTOLIC BLOOD PRESSURE: 112 MMHG | TEMPERATURE: 98.8 F | OXYGEN SATURATION: 98 % | RESPIRATION RATE: 18 BRPM | HEART RATE: 110 BPM

## 2022-05-31 DIAGNOSIS — Z20.822 SUSPECTED COVID-19 VIRUS INFECTION: Primary | ICD-10-CM

## 2022-05-31 LAB — SARS-COV-2, COV2: NORMAL

## 2022-05-31 PROCEDURE — 99282 EMERGENCY DEPT VISIT SF MDM: CPT

## 2022-05-31 PROCEDURE — U0003 INFECTIOUS AGENT DETECTION BY NUCLEIC ACID (DNA OR RNA); SEVERE ACUTE RESPIRATORY SYNDROME CORONAVIRUS 2 (SARS-COV-2) (CORONAVIRUS DISEASE [COVID-19]), AMPLIFIED PROBE TECHNIQUE, MAKING USE OF HIGH THROUGHPUT TECHNOLOGIES AS DESCRIBED BY CMS-2020-01-R: HCPCS

## 2022-05-31 NOTE — Clinical Note
[address] [place] [address] [facility] DE[facility]  [id number] [id number]  [phone]    Work/School Note    Date: 5/31/2022     To Whom It May concern:    Ángel Temple was evaluated by the following provider(s):  Attending Provider: Garrett Teresa DO  Nurse Practitioner: Adams Gottron, FNP. COVID19 virus is suspected. Per the CDC guidelines we recommend home isolation until the following conditions are all met:    1. At least five days have passed since symptoms first appeared and/or had a close exposure,   2. After home isolation for five days, wearing a mask around others for the next five days,  3. At least 24 have passed since last fever without the use of fever-reducing medications and  4.  Symptoms (eg cough, shortness of breath) have improved      Sincerely,          EILEEN Rollins

## 2022-05-31 NOTE — ED PROVIDER NOTES
EMERGENCY DEPARTMENT HISTORY AND PHYSICAL EXAM    Date: 5/31/2022  Patient Name: Maria De Jesus Price    History of Presenting Illness     Chief Complaint   Patient presents with    Flu Like Symptoms       History Provided By: Patient      Additional History (Context): Maria De Jesus Price is a 32 y.o. female with past medical history significant for asthma and other problems as below who voices concern over possible COVID-19 infection. She reports cough and fever for the last 3 to 4 days. Denies any wheezing or shortness of breath and has not needed to use her rescue inhaler. No prior infection or vaccination. Non-smoker. Patient unsure of close contact to confirmed patient with coronavirus. Denies any history of international travel. No immunocompromising conditions such as HIV, cancer, diabetes, transplant, alcoholism, kidney failure, autoimmune disease, taking immunosuppressive medications, or congenital disorder. Patient denies tobacco/vape use. Patient denies SOB, chest pain, or any neurological symptoms. There are no other complaints, changes, or physical findings at this time. PCP: Senait Turcios, DO    Current Outpatient Medications   Medication Sig Dispense Refill    lidocaine 4 % patch Apply to area of pain one every 12 hours 10 Patch 0    pseudoephedrine (Sudafed) 30 mg tablet Take 2 Tablets by mouth every six (6) hours as needed for Congestion. 30 Tablet 0    ondansetron (Zofran ODT) 4 mg disintegrating tablet 1 Tablet by SubLINGual route every eight (8) hours as needed for Nausea or Vomiting. 20 Tablet 0    ibuprofen (MOTRIN) 600 mg tablet Take 1 Tablet by mouth every six (6) hours as needed for Pain. 20 Tablet 0    fluticasone propionate (FLONASE) 50 mcg/actuation nasal spray 2 Sprays by Both Nostrils route daily. 1 Each 0    fluticasone/umeclidin/vilanter (TRELEGY ELLIPTA IN) Take 1 Puff by inhalation daily.  lurasidone (Latuda) 60 mg tab tablet Take  by mouth.       FLUoxetine (PROzac) 40 mg capsule Take 40 mg by mouth daily.  pseudoephedrine (Sudafed) 30 mg tablet Take 30 mg by mouth every six (6) hours as needed for Congestion.  PREDNISONE PO Take 3 Tablets by mouth daily.  L-Norgest&E Estradiol-E Estrad (Ashlyna) 0.15 mg-30 mcg (84)/10 mcg (7) 3MPk Ashlyna 0.15 mg-30 mcg (84)/10 mcg(7) tablets,3 month dose pack   1 tab po daily      albuterol (PROVENTIL HFA, VENTOLIN HFA, PROAIR HFA) 90 mcg/actuation inhaler Take 1-2 Puffs by inhalation every four (4) hours as needed for Wheezing. 1 Inhaler 0    lansoprazole (PREVACID) 30 mg capsule lansoprazole 30 mg capsule twice daily      montelukast (SINGULAIR) 10 mg tablet take 1 tablet by mouth at bedtime  0    traZODone (DESYREL) 50 mg tablet trazodone 50 mg tablet at bedtime      ALPRAZolam (XANAX) 0.5 mg tablet Take 0.5 mg by mouth three (3) times daily as needed for Anxiety.          Past History     Past Medical History:  Past Medical History:   Diagnosis Date    ADHD     Alternating constipation and diarrhea     Anxiety     Anxiety and depression     Asthma     Chronic cough     Chronic neck and back pain     Cough, persistent     Depression     Environmental allergies     Excessive daytime sleepiness     GERD (gastroesophageal reflux disease)     GERD (gastroesophageal reflux disease)     H/O seasonal allergies     Heavy menses     Joint pain     Left-sided chest wall pain     Marijuana use     Migraines     Panic attacks     Pelvic inflammatory disease     Pelvic pain     Piercing     bilateral tragus (unable to remove jewelry)    Post partum depression     Psychiatric problem     anxiety    Rash     under buttocks & inner thighs    Seizures ([address]     remote (only once)    SOB (shortness of breath)     Suicidal ideations     Tattoos 2019    recent tattoo       Past Surgical History:  Past Surgical History:   Procedure Laterality Date    HX  SECTION  2018 x1    HX DILATION AND CURETTAGE  01/2019    HX OTHER SURGICAL      3rd nipple removed from left side.  HX PELVIC LAPAROSCOPY  07/03/2019    diagnostic laparoscopy with lysis of adhesions and ablation of endometriosis    HX WISDOM TEETH EXTRACTION         Family History:  Family History   Problem Relation Age of Onset   French Cancer Mother         brain    Asthma Mother     Thyroid Disease Mother     No Known Problems Father     Asthma Maternal Grandmother     Lupus Maternal Grandmother        Social History:  Social History     Tobacco Use    Smoking status: Current Every Day Smoker    Smokeless tobacco: Never Used   Substance Use Topics    Alcohol use: Yes     Alcohol/week: 2.0 - 3.0 standard drinks     Types: 2 - 3 Shots of liquor per week     Comment: liquor or wine    Drug use: Yes     Types: Marijuana       Allergies: Allergies   Allergen Reactions    Adhesive Rash     BLISTERS AND IRRITATION WHERE ADHESIVES TOUCH SKIN    Adhesive Tape-Silicones Rash     Not allergic to silicone - ADHESIVE CAUSES SKIN IRRITATION    Egg White Other (comments)     GI distress         Review of Systems     Review of Systems    All Other Systems Negative  Physical Exam     Vitals:    05/31/22 1414   BP: (!) 112/99   Pulse: (!) 110   Resp: 18   Temp: 98.8 °F (37.1 °C)   SpO2: 98%     Physical Exam      Diagnostic Study Results     Labs -   No results found for this or any previous visit (from the past 12 hour(s)). Radiologic Studies -   No orders to display     CT Results  (Last 48 hours)    None        CXR Results  (Last 48 hours)    None            Medical Decision Making   I am the first provider for this patient. I reviewed the vital signs, available nursing notes, past medical history, past surgical history, family history and social history. Vital Signs-Reviewed the patient's vital signs.       Pulse Oximetry Analysis - 98% on room air-normal    Records Reviewed: Nursing notes, old medical records and any previous labs, imaging, visits, consultations pertinent to patient care    Procedures:  Procedures    PPE worn during exam: Gloves, eye protection, and surgical mask    ED Course: Progress Notes, Reevaluation, and Consults:  2:14 PM  Initial assessment performed. The patients presenting problems have been discussed, and they/their family are in agreement with the care plan formulated and outlined with them. I have encouraged them to ask questions as they arise throughout their visit. 2:18 PM Discussed the possibility of worsening despite outpatient treatment plan. Patient understands this possibility and will follow-up immediately with worsening symptoms. Follow-up w/ PCP and supportive treatment. Recommended taking Tylenol as needed for fever and body aches. Due to coronavirus outbreak will recommend a self quarantine per CDC recommendations. Patient is in agreement to the quarantine measures. Will be provided a work note as necessary. Reviewed with her that COVID-19 pandemic is an evolving situation with rapidly changing recommendations & guidelines. Medical decisions are made based on the the best information available at the time. Recommended she stay tuned for updates published by trusted sources and to advise your PCP of any unexpected changes in clinical condition. Provider Notes (Medical Decision Making):     Differential diagnosis: COVID-19, seasonal allergies/allergic rhinitis, URI, strep/viral pharyngitis, pneumonia, reactive airway/asthma exacerbation    77-year-old female patient here with request for COVID testing with complaints of cough and fever for the last 3 to 4 days. Patient presents for COVID 19 testing with normal oxygen saturation and mild URI symptoms or COVID 19 exposure. COVID 19 testing was conducted. The patient was given quarantine/isolation recommendations and agrees with the plan to be discharged home.  They were provided instructions to return for difficulty breathing, chest pain, altered mentation, or any other new or worsening symptoms. Vital signs are stable and patient is afebrile. Exam unremarkable. No retraction,stridor, or wheezing. No indication for labs or imaging. Most consistent w/ viral infection, URI, COVID-19. MED RECONCILIATION:  No current facility-administered medications for this encounter. Current Outpatient Medications   Medication Sig    lidocaine 4 % patch Apply to area of pain one every 12 hours    pseudoephedrine (Sudafed) 30 mg tablet Take 2 Tablets by mouth every six (6) hours as needed for Congestion.  ondansetron (Zofran ODT) 4 mg disintegrating tablet 1 Tablet by SubLINGual route every eight (8) hours as needed for Nausea or Vomiting.  ibuprofen (MOTRIN) 600 mg tablet Take 1 Tablet by mouth every six (6) hours as needed for Pain.  fluticasone propionate (FLONASE) 50 mcg/actuation nasal spray 2 Sprays by Both Nostrils route daily.  fluticasone/umeclidin/vilanter (TRELEGY ELLIPTA IN) Take 1 Puff by inhalation daily.  lurasidone (Latuda) 60 mg tab tablet Take  by mouth.  FLUoxetine (PROzac) 40 mg capsule Take 40 mg by mouth daily.  pseudoephedrine (Sudafed) 30 mg tablet Take 30 mg by mouth every six (6) hours as needed for Congestion.  PREDNISONE PO Take 3 Tablets by mouth daily.  L-Norgest&E Estradiol-E Estrad (Ashlyna) 0.15 mg-30 mcg (84)/10 mcg (7) 3MPk Ashlyna 0.15 mg-30 mcg (84)/10 mcg(7) tablets,3 month dose pack   1 tab po daily    albuterol (PROVENTIL HFA, VENTOLIN HFA, PROAIR HFA) 90 mcg/actuation inhaler Take 1-2 Puffs by inhalation every four (4) hours as needed for Wheezing.     lansoprazole (PREVACID) 30 mg capsule lansoprazole 30 mg capsule twice daily    montelukast (SINGULAIR) 10 mg tablet take 1 tablet by mouth at bedtime    traZODone (DESYREL) 50 mg tablet trazodone 50 mg tablet at bedtime    ALPRAZolam (XANAX) 0.5 mg tablet Take 0.5 mg by mouth three (3) times daily as needed for Anxiety. Disposition:  Discharge home in stable condition with strict self quarantine instructions as discussed    DISCHARGE NOTE:     Patient has been reexamined. Patient has no new complaints, changes, or physical findings. Vital signs are stable. Care plan outlined and precautions discussed. Results of work up, plan of care and treatment plan, expectations, etc were reviewed with the patient. All medications were reviewed with the patient; will d/c home with outpatient f/u. All of pt's questions and concerns were addressed. Patient was instructed and agrees to follow up with pcp/specialists if indicated, as well as to return to the ED upon further deterioration. Patient is ready to go home. Follow-up Information     Follow up With Specialties Details Why Contact Info    [address], DO Family Medicine Schedule an appointment as soon as possible for a visit  Follow-up from the Emergency Department [address] [facility] [address] [address] [phone] [address] [facility] EMERGENCY DEPT Emergency Medicine  As needed, If symptoms worsen [address] [place] [address]  [phone]          Current Discharge Medication List              Diagnosis     Clinical Impression:   1. Suspected COVID-19 virus infection          Dictation disclaimer:  Please note that this dictation was completed with [employer], the computer voice recognition software. Quite often unanticipated grammatical, syntax, homophones, and other interpretive errors are inadvertently transcribed by the computer software. Please disregard these errors. Please excuse any errors that have escaped final proofreading.

## 2022-05-31 NOTE — ED NOTES
Hazel Garcia is a 32 y.o. female that was discharged in stable condition. The patients diagnosis, condition and treatment were explained to  patient and aftercare instructions were given. The patient verbalized understanding. Patient armband removed and shredded.

## 2022-05-31 NOTE — DISCHARGE INSTRUCTIONS
COVID Testing results will be available on [facility]. Patient should utilize [facility] to access results. Take Tylenol or Ibuprofen as needed  Drink plenty of fluids  Return to ED if worse especially if any shortness of breath, chest pain or altered mentation.

## 2022-06-01 ENCOUNTER — PATIENT OUTREACH (OUTPATIENT)
Dept: CASE MANAGEMENT | Age: 27
End: 2022-06-01

## 2022-06-01 LAB — SARS-COV-2, NAA: NOT DETECTED

## 2022-06-01 NOTE — PROGRESS NOTES
Date/Time:  6/1/2022 2:04 PM   Call within 2 business days of discharge: Yes   Attempted to reach patient by telephone. Mailbox is full and unable to leave message at this time. Will attempt to reach patient again.

## 2022-06-06 ENCOUNTER — HOSPITAL ENCOUNTER (EMERGENCY)
Age: 27
Discharge: HOME | End: 2022-06-06
Attending: STUDENT IN AN ORGANIZED HEALTH CARE EDUCATION/TRAINING PROGRAM
Payer: MEDICAID

## 2022-06-06 VITALS
WEIGHT: 150 LBS | OXYGEN SATURATION: 98 % | RESPIRATION RATE: 24 BRPM | DIASTOLIC BLOOD PRESSURE: 93 MMHG | HEIGHT: 68 IN | HEART RATE: 99 BPM | BODY MASS INDEX: 22.73 KG/M2 | TEMPERATURE: 98.7 F | SYSTOLIC BLOOD PRESSURE: 125 MMHG

## 2022-06-06 DIAGNOSIS — J45.21 MILD INTERMITTENT ASTHMA WITH ACUTE EXACERBATION: Primary | ICD-10-CM

## 2022-06-06 PROCEDURE — 94640 AIRWAY INHALATION TREATMENT: CPT

## 2022-06-06 PROCEDURE — 74011636637 HC RX REV CODE- 636/637: Performed by: STUDENT IN AN ORGANIZED HEALTH CARE EDUCATION/TRAINING PROGRAM

## 2022-06-06 PROCEDURE — 74011000250 HC RX REV CODE- 250: Performed by: STUDENT IN AN ORGANIZED HEALTH CARE EDUCATION/TRAINING PROGRAM

## 2022-06-06 PROCEDURE — 99283 EMERGENCY DEPT VISIT LOW MDM: CPT

## 2022-06-06 RX ORDER — IPRATROPIUM BROMIDE AND ALBUTEROL SULFATE 2.5; .5 MG/3ML; MG/3ML
3 SOLUTION RESPIRATORY (INHALATION)
Status: COMPLETED | OUTPATIENT
Start: 2022-06-06 | End: 2022-06-06

## 2022-06-06 RX ORDER — PREDNISONE 20 MG/1
60 TABLET ORAL
Status: COMPLETED | OUTPATIENT
Start: 2022-06-06 | End: 2022-06-06

## 2022-06-06 RX ORDER — BUDESONIDE AND FORMOTEROL FUMARATE DIHYDRATE 160; 4.5 UG/1; UG/1
AEROSOL RESPIRATORY (INHALATION) AS NEEDED
COMMUNITY
Start: 2022-05-27 | End: 2022-09-24 | Stop reason: SDUPTHER

## 2022-06-06 RX ORDER — PREDNISONE 50 MG/1
50 TABLET ORAL DAILY
Qty: 4 TABLET | Refills: 0 | Status: SHIPPED | OUTPATIENT
Start: 2022-06-06 | End: 2022-06-10

## 2022-06-06 RX ADMIN — IPRATROPIUM BROMIDE AND ALBUTEROL SULFATE 3 ML: .5; 2.5 SOLUTION RESPIRATORY (INHALATION) at 10:38

## 2022-06-06 RX ADMIN — PREDNISONE 60 MG: 20 TABLET ORAL at 10:37

## 2022-06-06 NOTE — Clinical Note
[address] [facility] EMERGENCY DEPT  [address] [id number] [phone]    Work/School Note    Date: 6/6/2022    To Whom It May concern:      Jessica Dalton was seen and treated today in the emergency room by the following provider(s):  Attending Provider: Yogi Goldberg DO. Jessica Dalton is excused from work/school on 06/06/22. She is clear to return to work/school on 06/07/22.         Sincerely,          Willow Lopez DO

## 2022-06-06 NOTE — ED TRIAGE NOTES
Patient states having cough, headache, and shortness of breath x 10 days. She states being negative for Covid on 5/31/22. She c/o continuing cough, wheezing, shortness of breath. Advises that she began experiencing diarrhea four days ago. States prior hx of Asthma. Used her albuterol inhaler this morning with minimal relief.

## 2022-06-06 NOTE — ED PROVIDER NOTES
EMERGENCY DEPARTMENT HISTORY AND PHYSICAL EXAM      Date: 6/6/2022  Patient Name: Shon Vidal    History of Presenting Illness     Chief Complaint   Patient presents with    Wheezing    Asthma    Shortness of Breath       History (Context): Shon Vidal is a 32 y.o. female with a past medical history significant for ADHD, anxiety, GERD, panic attacks comes into the ED today due to dyspnea. Patient states that she has felt dyspneic over the past few days however acutely worsened this morning. States that she took her albuterol inhaler for treatment of her symptoms multiple times prior to coming in. Admits to exacerbation of her symptoms with exertion and mildly improved with rest.  Does admit to associated dry cough but denies any fever, chills, abdominal pain, nausea, vomiting, diarrhea, or chest pain. She does state that she was recently sick approximately 1 to 2 weeks ago and continues to have some \"chest congestion from that time. \"  Patient states symptoms are severe in quality. PCP: Dread Ruiz,     Current Facility-Administered Medications   Medication Dose Route Frequency Provider Last Rate Last Admin    albuterol-ipratropium (DUO-NEB) 2.5 MG-0.5 MG/3 ML  3 mL Nebulization NOW Eugenio Mae,         predniSONE (DELTASONE) tablet 60 mg  60 mg Oral NOW Eugenio Mae,         albuterol-ipratropium (DUO-NEB) 2.5 MG-0.5 MG/3 ML  3 mL Nebulization NOW Eugenio Mae,         albuterol-ipratropium (DUO-NEB) 2.5 MG-0.5 MG/3 ML  3 mL Nebulization NOW Tatiana Cardoza DO         Current Outpatient Medications   Medication Sig Dispense Refill    fluticasone propionate (FLONASE) 50 mcg/actuation nasal spray 2 Sprays by Both Nostrils route daily. 1 Each 0    fluticasone/umeclidin/vilanter (TRELEGY ELLIPTA IN) Take 1 Puff by inhalation daily.  FLUoxetine (PROzac) 40 mg capsule Take 40 mg by mouth daily.       L-Norgest&E Estradiol-E Estrad (Ashlyna) 0.15 mg-30 mcg (84)/10 mcg (7) 3MPk Ashlyna 0.15 mg-30 mcg (84)/10 mcg(7) tablets,3 month dose pack   1 tab po daily      albuterol (PROVENTIL HFA, VENTOLIN HFA, PROAIR HFA) 90 mcg/actuation inhaler Take 1-2 Puffs by inhalation every four (4) hours as needed for Wheezing. 1 Inhaler 0    lansoprazole (PREVACID) 30 mg capsule lansoprazole 30 mg capsule twice daily      montelukast (SINGULAIR) 10 mg tablet take 1 tablet by mouth at bedtime  0    Symbicort 160-4.5 mcg/actuation HFAA inhale 2 puffs by mouth twice a day Rinse mouth after use         Past History     Past Medical History:   Past Medical History:   Diagnosis Date    ADHD     Alternating constipation and diarrhea     Anxiety     Anxiety and depression     Asthma     Chronic cough     Chronic neck and back pain     Cough, persistent     Depression     Environmental allergies     Excessive daytime sleepiness     GERD (gastroesophageal reflux disease)     GERD (gastroesophageal reflux disease)     H/O seasonal allergies     Heavy menses     Joint pain     Left-sided chest wall pain     Marijuana use     Migraines     Panic attacks     Pelvic inflammatory disease     Pelvic pain     Piercing     bilateral tragus (unable to remove jewelry)    Post partum depression     Psychiatric problem     anxiety    Rash     under buttocks & inner thighs    Seizures ([address]     remote (only once)    SOB (shortness of breath)     Suicidal ideations     Tattoos 2019    recent tattoo       Past Surgical History:  Past Surgical History:   Procedure Laterality Date    HX  SECTION  05/04/2018    x1    HX DILATION AND CURETTAGE  2019    HX OTHER SURGICAL      3rd nipple removed from left side.     HX PELVIC LAPAROSCOPY  2019    diagnostic laparoscopy with lysis of adhesions and ablation of endometriosis    HX WISDOM TEETH EXTRACTION         Family History:  Family History   Problem Relation Age of Onset    Cancer Mother         brain    Asthma Mother     Thyroid Disease Mother     No Known Problems Father     Asthma Maternal Grandmother     Lupus Maternal Grandmother        Social History:   Social History     Tobacco Use    Smoking status: Current Some Day Smoker    Smokeless tobacco: Never Used    Tobacco comment: smokes weed   Substance Use Topics    Alcohol use: Yes     Alcohol/week: 2.0 - 3.0 standard drinks     Types: 2 - 3 Shots of liquor per week     Comment: liquor or wine    Drug use: Yes     Types: Marijuana       Allergies: Allergies   Allergen Reactions    Adhesive Rash     BLISTERS AND IRRITATION WHERE ADHESIVES TOUCH SKIN    Adhesive Tape-Silicones Rash     Not allergic to silicone - ADHESIVE CAUSES SKIN IRRITATION    Egg White Other (comments)     GI distress       PMH, PSH, family history, social history, allergies reviewed with the patient with significant items noted above. Review of Systems   Review of Systems   Constitutional: Negative for chills and fever. HENT: Negative for sore throat. Eyes: Negative for visual disturbance. Respiratory: Positive for cough, chest tightness and wheezing. Negative for shortness of breath. Cardiovascular: Negative for chest pain. Gastrointestinal: Negative for abdominal pain, nausea and vomiting. Genitourinary: Negative for difficulty urinating. Musculoskeletal: Negative for myalgias. Skin: Negative for rash. Neurological: Negative for headaches. Physical Exam     Vitals:    06/06/22 1011   BP: (!) 125/93   Pulse: 99   Resp: 24   Temp: 98.7 °F (37.1 °C)   SpO2: 98%   Weight: 68 kg (150 lb)   Height: 5' 8\" (1.727 m)       Physical Exam  Vitals and nursing note reviewed. Constitutional:       General: She is not in acute distress. Appearance: Normal appearance. She is not ill-appearing or toxic-appearing. HENT:      Head: Normocephalic and atraumatic.       Mouth/Throat:      Mouth: Mucous membranes are moist.   Eyes:      General: No scleral icterus. Conjunctiva/sclera: Conjunctivae normal.   Cardiovascular:      Rate and Rhythm: Normal rate and regular rhythm. Pulses: Normal pulses. Comments: Normal peripheral perfusion  Pulmonary:      Effort: Pulmonary effort is normal. No respiratory distress. Comments: Cough present  Abdominal:      General: There is no distension. Palpations: Abdomen is soft. Tenderness: There is no abdominal tenderness. Musculoskeletal:         General: No deformity. Normal range of motion. Cervical back: Normal range of motion and neck supple. Skin:     General: Skin is warm and dry. Findings: No rash. Neurological:      General: No focal deficit present. Mental Status: She is alert and oriented to person, place, and time. Mental status is at baseline. Psychiatric:         Mood and Affect: Mood normal.         Thought Content: Thought content normal.         Diagnostic Study Results     Labs -   No results found for this or any previous visit (from the past 12 hour(s)). Labs Reviewed - No data to display    Radiologic Studies -   No orders to display     CT Results  (Last 48 hours)    None        CXR Results  (Last 48 hours)    None          The laboratory results, imaging results, and other diagnostic exams were reviewed in the EMR. Medical Decision Making   I am the first provider for this patient. I reviewed the vital signs, available nursing notes, past medical history, past surgical history, family history and social history. Vital Signs-Reviewed the patient's vital signs. Records Reviewed: Personally, on initial evaluation    MDM:   Maria De Jesus Price presents with complaint of apnea  DDX includes but is not limited to: Acute asthma exacerbation, dyspnea, medical screening exam    Patient overall well-appearing, no acute distress, and vital signs grossly within normal limits.   Will provide patient with duo nebs and prednisone for further treatment of her symptoms. Will reassess patient following. Will continue to monitor and evaluate patient while in the ED. Orders as below:  Orders Placed This Encounter    Symbicort 160-4.5 mcg/actuation HFAA    albuterol-ipratropium (DUO-NEB) 2.5 MG-0.5 MG/3 ML    predniSONE (DELTASONE) tablet 60 mg    albuterol-ipratropium (DUO-NEB) 2.5 MG-0.5 MG/3 ML    albuterol-ipratropium (DUO-NEB) 2.5 MG-0.5 MG/3 ML        ED Course:   ED Course as of 06/06/22 1513   Mon Jun 06, 2022   1136 Patient states improvement of her symptoms following treatment. Will discharge patient home with return precautions and follow-up recommendations. Patient verbalized understanding is without any further questions. [DV]      ED Course User Index  [DV] Tamra Brown DO           Procedures:  Procedures        Diagnosis and Disposition     CLINICAL IMPRESSION:  No diagnosis found. Current Discharge Medication List          Disposition: Home    Patient condition at time of disposition: Stable    DISCHARGE NOTE:   Pt has been reexamined. Patient has no new complaints, changes, or physical findings. Care plan outlined and precautions discussed. Results were reviewed with the patient. All medications were reviewed with the patient. All of pt's questions and concerns were addressed. Alarm symptoms and return precautions associated with chief complaint and evaluation were reviewed with the patient in detail. The patient demonstrated adequate understanding. Patient was instructed to follow up with PCP, as well as strict return precautions to the ED upon further deterioration. Patient is ready to go home. The patient is happy with this plan        Dragon Disclaimer     Please note that this dictation was completed with [employer], the [employer] voice recognition software. Quite often unanticipated grammatical, syntax, homophones, and other interpretive errors are inadvertently transcribed by the computer software. Please disregard these errors. Please excuse any errors that have escaped final proofreading. Dulce MÉNDEZ

## 2022-07-20 ENCOUNTER — HOSPITAL ENCOUNTER (EMERGENCY)
Age: 27
Discharge: HOME | End: 2022-07-20
Attending: EMERGENCY MEDICINE
Payer: MEDICAID

## 2022-07-20 VITALS
SYSTOLIC BLOOD PRESSURE: 119 MMHG | TEMPERATURE: 98.6 F | BODY MASS INDEX: 23.49 KG/M2 | HEIGHT: 68 IN | OXYGEN SATURATION: 97 % | DIASTOLIC BLOOD PRESSURE: 81 MMHG | RESPIRATION RATE: 18 BRPM | HEART RATE: 100 BPM | WEIGHT: 155 LBS

## 2022-07-20 DIAGNOSIS — K52.9 GASTROENTERITIS, ACUTE: Primary | ICD-10-CM

## 2022-07-20 LAB
ALBUMIN SERPL-MCNC: 3.3 G/DL (ref 3.4–5)
ALBUMIN/GLOB SERPL: 0.9 {RATIO} (ref 0.8–1.7)
ALP SERPL-CCNC: 63 U/L (ref 45–117)
ALT SERPL-CCNC: 12 U/L (ref 13–56)
ANION GAP SERPL CALC-SCNC: 5 MMOL/L (ref 3–18)
APPEARANCE UR: ABNORMAL
AST SERPL-CCNC: 11 U/L (ref 10–38)
BACTERIA URNS QL MICRO: ABNORMAL /HPF
BASOPHILS # BLD: 0 K/UL (ref 0–0.1)
BASOPHILS NFR BLD: 0 % (ref 0–2)
BILIRUB SERPL-MCNC: 0.4 MG/DL (ref 0.2–1)
BILIRUB UR QL: NEGATIVE
BUN SERPL-MCNC: 13 MG/DL (ref 7–18)
BUN/CREAT SERPL: 19 (ref 12–20)
CALCIUM SERPL-MCNC: 9.3 MG/DL (ref 8.5–10.1)
CHLORIDE SERPL-SCNC: 108 MMOL/L (ref 100–111)
CO2 SERPL-SCNC: 26 MMOL/L (ref 21–32)
COLOR UR: YELLOW
CREAT SERPL-MCNC: 0.68 MG/DL (ref 0.6–1.3)
DIFFERENTIAL METHOD BLD: ABNORMAL
EOSINOPHIL # BLD: 0 K/UL (ref 0–0.4)
EOSINOPHIL NFR BLD: 0 % (ref 0–5)
EPITH CASTS URNS QL MICRO: ABNORMAL /LPF (ref 0–5)
ERYTHROCYTE [DISTWIDTH] IN BLOOD BY AUTOMATED COUNT: 16.3 % (ref 11.6–14.5)
GLOBULIN SER CALC-MCNC: 3.8 G/DL (ref 2–4)
GLUCOSE SERPL-MCNC: 88 MG/DL (ref 74–99)
GLUCOSE UR STRIP.AUTO-MCNC: NEGATIVE MG/DL
HCG UR QL: NEGATIVE
HCT VFR BLD AUTO: 35.8 % (ref 35–45)
HGB BLD-MCNC: 12 G/DL (ref 12–16)
HGB UR QL STRIP: NEGATIVE
IMM GRANULOCYTES # BLD AUTO: 0 K/UL
IMM GRANULOCYTES NFR BLD AUTO: 0 %
KETONES UR QL STRIP.AUTO: ABNORMAL MG/DL
LEUKOCYTE ESTERASE UR QL STRIP.AUTO: ABNORMAL
LIPASE SERPL-CCNC: 102 U/L (ref 73–393)
LYMPHOCYTES # BLD: 4.4 K/UL (ref 0.9–3.6)
LYMPHOCYTES NFR BLD: 40 % (ref 21–52)
MCH RBC QN AUTO: 31.1 PG (ref 24–34)
MCHC RBC AUTO-ENTMCNC: 33.5 G/DL (ref 31–37)
MCV RBC AUTO: 92.7 FL (ref 78–100)
MONOCYTES # BLD: 0.6 K/UL (ref 0.05–1.2)
MONOCYTES NFR BLD: 5 % (ref 3–10)
MUCOUS THREADS URNS QL MICRO: ABNORMAL /LPF
NEUTS SEG # BLD: 6 K/UL (ref 1.8–8)
NEUTS SEG NFR BLD: 55 % (ref 40–73)
NITRITE UR QL STRIP.AUTO: NEGATIVE
NRBC # BLD: 0 K/UL (ref 0–0.01)
NRBC BLD-RTO: 0 PER 100 WBC
PH UR STRIP: 5.5 [PH] (ref 5–8)
PLATELET # BLD AUTO: 352 K/UL (ref 135–420)
PLATELET COMMENTS,PCOM: ABNORMAL
PMV BLD AUTO: 9.6 FL (ref 9.2–11.8)
POTASSIUM SERPL-SCNC: 3.6 MMOL/L (ref 3.5–5.5)
PROT SERPL-MCNC: 7.1 G/DL (ref 6.4–8.2)
PROT UR STRIP-MCNC: ABNORMAL MG/DL
RBC # BLD AUTO: 3.86 M/UL (ref 4.2–5.3)
RBC #/AREA URNS HPF: ABNORMAL /HPF (ref 0–5)
RBC MORPH BLD: ABNORMAL
SODIUM SERPL-SCNC: 139 MMOL/L (ref 136–145)
SP GR UR REFRACTOMETRY: 1.03 (ref 1–1.03)
UROBILINOGEN UR QL STRIP.AUTO: 1 EU/DL (ref 0.2–1)
WBC # BLD AUTO: 11 K/UL (ref 4.6–13.2)
WBC URNS QL MICRO: ABNORMAL /HPF (ref 0–4)

## 2022-07-20 PROCEDURE — 81025 URINE PREGNANCY TEST: CPT

## 2022-07-20 PROCEDURE — 80053 COMPREHEN METABOLIC PANEL: CPT

## 2022-07-20 PROCEDURE — 85025 COMPLETE CBC W/AUTO DIFF WBC: CPT

## 2022-07-20 PROCEDURE — 81001 URINALYSIS AUTO W/SCOPE: CPT

## 2022-07-20 PROCEDURE — 83690 ASSAY OF LIPASE: CPT

## 2022-07-20 PROCEDURE — 74011250636 HC RX REV CODE- 250/636: Performed by: EMERGENCY MEDICINE

## 2022-07-20 PROCEDURE — 99284 EMERGENCY DEPT VISIT MOD MDM: CPT

## 2022-07-20 PROCEDURE — 96374 THER/PROPH/DIAG INJ IV PUSH: CPT

## 2022-07-20 RX ORDER — ONDANSETRON 2 MG/ML
4 INJECTION INTRAMUSCULAR; INTRAVENOUS
Status: COMPLETED | OUTPATIENT
Start: 2022-07-20 | End: 2022-07-20

## 2022-07-20 RX ORDER — DOXYCYCLINE 100 MG/1
100 CAPSULE ORAL 2 TIMES DAILY
COMMUNITY

## 2022-07-20 RX ADMIN — ONDANSETRON 4 MG: 2 INJECTION INTRAMUSCULAR; INTRAVENOUS at 21:05

## 2022-07-20 RX ADMIN — SODIUM CHLORIDE 1000 ML: 900 INJECTION, SOLUTION INTRAVENOUS at 21:05

## 2022-07-21 NOTE — ED PROVIDER NOTES
HPI 24-year-old female who a D&C 2 weeks ago and was treated with doxycycline. Since then she has had intermittent nausea malaise and diarrhea. She spoke to her gynecologist who told her to go to the ER for further evaluation. No other complaints. Past Medical History:   Diagnosis Date    ADHD     Alternating constipation and diarrhea     Anemia     Anxiety     Anxiety and depression     Asthma     Chronic cough     Chronic neck and back pain     Cough, persistent     Depression     Endometriosis     Environmental allergies     Excessive daytime sleepiness     GERD (gastroesophageal reflux disease)     GERD (gastroesophageal reflux disease)     H/O seasonal allergies     Heavy menses     Joint pain     Left-sided chest wall pain     Marijuana use     Migraines     Panic attacks     Pelvic inflammatory disease     Pelvic pain     Piercing     bilateral tragus (unable to remove jewelry)    Post partum depression     Psychiatric problem     anxiety    Rash     under buttocks & inner thighs    Seizures ([address]     remote (only once)    Sleep apnea     cpap    SOB (shortness of breath)     Suicidal ideations     Tattoos 2019    recent tattoo       Past Surgical History:   Procedure Laterality Date    HX  SECTION  05/04/2018    x1    HX DILATION AND CURETTAGE  2019    HX OTHER SURGICAL      3rd nipple removed from left side.     HX PELVIC LAPAROSCOPY  2019    diagnostic laparoscopy with lysis of adhesions and ablation of endometriosis    HX WISDOM TEETH EXTRACTION           Family History:   Problem Relation Age of Onset    Cancer Mother         brain    Asthma Mother     Thyroid Disease Mother     No Known Problems Father     Asthma Maternal Grandmother     Lupus Maternal Grandmother        Social History     Socioeconomic History    Marital status: SINGLE     Spouse name: Not on file    Number of children: Not on file    Years of education: Not on file    Highest education level: Not on file Occupational History    Not on file   Tobacco Use    Smoking status: Some Days    Smokeless tobacco: Never    Tobacco comments:     smokes weed   Substance and Sexual Activity    Alcohol use: Yes     Alcohol/week: 2.0 - 3.0 standard drinks     Types: 2 - 3 Shots of liquor per week     Comment: liquor or wine    Drug use: Yes     Types: Marijuana     Comment: Daily    Sexual activity: Not on file   Other Topics Concern    Not on file   Social History Narrative    Not on file     Social Determinants of Health     Financial Resource Strain: Not on file   Food Insecurity: Not on file   Transportation Needs: Not on file   Physical Activity: Not on file   Stress: Not on file   Social Connections: Not on file   Intimate Partner Violence: Not on file   Housing Stability: Not on file         ALLERGIES: Adhesive, Adhesive tape-silicones, and Egg white    Review of Systems   Constitutional: Negative. HENT: Negative. Eyes: Negative. Respiratory: Negative. Cardiovascular: Negative. Gastrointestinal:  Positive for diarrhea, nausea and vomiting. Endocrine: Negative. Genitourinary: Negative. Musculoskeletal: Negative. Skin: Negative. Allergic/Immunologic: Negative. Neurological: Negative. Hematological: Negative. Psychiatric/Behavioral: Negative. All other systems reviewed and are negative. Vitals:    07/20/22 1952   BP: 119/81   Pulse: 100   Resp: 18   Temp: 98.6 °F (37 °C)   SpO2: 97%   Weight: 70.3 kg (155 lb)   Height: 5' 8\" (1.727 m)            Physical Exam  Vitals and nursing note reviewed. Constitutional:       General: She is not in acute distress. Appearance: She is well-developed. She is not diaphoretic. HENT:      Head: Normocephalic. Right Ear: External ear normal.      Left Ear: External ear normal.      Mouth/Throat:      Pharynx: No oropharyngeal exudate. Eyes:      General: No scleral icterus. Right eye: No discharge.          Left eye: No discharge. Conjunctiva/sclera: Conjunctivae normal.      Pupils: Pupils are equal, round, and reactive to light. Neck:      Thyroid: No thyromegaly. Vascular: No JVD. Trachea: No tracheal deviation. Cardiovascular:      Rate and Rhythm: Normal rate and regular rhythm. Heart sounds: Normal heart sounds. No murmur heard. No friction rub. No gallop. Pulmonary:      Effort: Pulmonary effort is normal. No respiratory distress. Breath sounds: Normal breath sounds. No stridor. No wheezing or rales. Chest:      Chest wall: No tenderness. Abdominal:      General: Bowel sounds are normal. There is no distension. Palpations: Abdomen is soft. There is no mass. Tenderness: There is no abdominal tenderness. There is no guarding or rebound. Musculoskeletal:         General: No tenderness. Normal range of motion. Cervical back: Normal range of motion and neck supple. Lymphadenopathy:      Cervical: No cervical adenopathy. Skin:     General: Skin is warm and dry. Coloration: Skin is not pale. Findings: No erythema or rash. Neurological:      Mental Status: She is alert and oriented to person, place, and time. Cranial Nerves: No cranial nerve deficit. Motor: No abnormal muscle tone. Coordination: Coordination normal.      Deep Tendon Reflexes: Reflexes normal.        MDM     Amount and/or Complexity of Data Reviewed  Clinical lab tests: ordered and reviewed    Risk of Complications, Morbidity, and/or Mortality  Presenting problems: high  Diagnostic procedures: high  Management options: moderate           Procedures    Demential diagnosis: Medication reaction secondary to taking doxycycline, gastroenteritis, dehydration    Lab test: Interpreted by me    Hospital course: Patient treated with Zofran and IV hydration. Reassessed prior to discharge:patient asymptomatic    Diagnosis: gastroenteritis, medication reaction    Disposition: D/C home.   F/U GYN in 2 to 3 days. Return to ER prn. Dictation disclaimer:  Please note that this dictation was completed with [employer] the computer voice recognition software. Quite often unanticipated grammatical, syntax, homophones, and other interpretive errors are inadvertently transcribed by the computer software. Please disregard these errors. Please excuse any errors that have escaped final proofreading.

## 2022-07-21 NOTE — ED NOTES
10:26 PM  07/20/22     Discharge instructions given to Rosetta Sam (name) with verbalization of understanding. Patient accompanied by self. Patient discharged with the following prescriptions n/a. Patient discharged to home (destination).       Florence Kimball RN

## 2022-09-13 ENCOUNTER — TRANSCRIBE ORDER (OUTPATIENT)
Dept: SCHEDULING | Age: 27
End: 2022-09-13

## 2022-09-13 DIAGNOSIS — R10.31 INGUINAL PAIN, RIGHT: Primary | ICD-10-CM

## 2022-09-24 ENCOUNTER — HOSPITAL ENCOUNTER (EMERGENCY)
Age: 27
Discharge: HOME | End: 2022-09-24
Attending: STUDENT IN AN ORGANIZED HEALTH CARE EDUCATION/TRAINING PROGRAM
Payer: MEDICAID

## 2022-09-24 VITALS
HEIGHT: 68 IN | HEART RATE: 100 BPM | SYSTOLIC BLOOD PRESSURE: 112 MMHG | WEIGHT: 156 LBS | RESPIRATION RATE: 18 BRPM | BODY MASS INDEX: 23.64 KG/M2 | TEMPERATURE: 98.6 F | OXYGEN SATURATION: 100 % | DIASTOLIC BLOOD PRESSURE: 77 MMHG

## 2022-09-24 DIAGNOSIS — J45.21 MILD INTERMITTENT ASTHMA WITH ACUTE EXACERBATION: Primary | ICD-10-CM

## 2022-09-24 PROCEDURE — 94640 AIRWAY INHALATION TREATMENT: CPT

## 2022-09-24 PROCEDURE — 99283 EMERGENCY DEPT VISIT LOW MDM: CPT

## 2022-09-24 PROCEDURE — 74011250636 HC RX REV CODE- 250/636: Performed by: STUDENT IN AN ORGANIZED HEALTH CARE EDUCATION/TRAINING PROGRAM

## 2022-09-24 PROCEDURE — 74011000250 HC RX REV CODE- 250: Performed by: STUDENT IN AN ORGANIZED HEALTH CARE EDUCATION/TRAINING PROGRAM

## 2022-09-24 RX ORDER — FLUTICASONE FUROATE, UMECLIDINIUM BROMIDE AND VILANTEROL TRIFENATATE 100; 62.5; 25 UG/1; UG/1; UG/1
1 POWDER RESPIRATORY (INHALATION) DAILY
Qty: 60 EACH | Refills: 0 | Status: SHIPPED | OUTPATIENT
Start: 2022-09-24

## 2022-09-24 RX ORDER — BUDESONIDE AND FORMOTEROL FUMARATE DIHYDRATE 160; 4.5 UG/1; UG/1
2 AEROSOL RESPIRATORY (INHALATION) DAILY
Qty: 10.2 G | Refills: 0 | Status: SHIPPED | OUTPATIENT
Start: 2022-09-24

## 2022-09-24 RX ORDER — DEXAMETHASONE 4 MG/1
10 TABLET ORAL ONCE
Status: COMPLETED | OUTPATIENT
Start: 2022-09-24 | End: 2022-09-24

## 2022-09-24 RX ORDER — ALBUTEROL SULFATE 90 UG/1
2 AEROSOL, METERED RESPIRATORY (INHALATION)
Qty: 18 G | Refills: 0 | Status: SHIPPED | OUTPATIENT
Start: 2022-09-24

## 2022-09-24 RX ORDER — IPRATROPIUM BROMIDE AND ALBUTEROL SULFATE 2.5; .5 MG/3ML; MG/3ML
9 SOLUTION RESPIRATORY (INHALATION)
Status: COMPLETED | OUTPATIENT
Start: 2022-09-24 | End: 2022-09-24

## 2022-09-24 RX ORDER — DEXAMETHASONE 6 MG/1
TABLET ORAL
Qty: 2 TABLET | Refills: 0 | Status: SHIPPED | OUTPATIENT
Start: 2022-09-24

## 2022-09-24 RX ORDER — MONTELUKAST SODIUM 10 MG/1
10 TABLET ORAL
Qty: 30 TABLET | Refills: 0 | Status: SHIPPED | OUTPATIENT
Start: 2022-09-24

## 2022-09-24 RX ADMIN — IPRATROPIUM BROMIDE AND ALBUTEROL SULFATE 9 ML: .5; 3 SOLUTION RESPIRATORY (INHALATION) at 10:42

## 2022-09-24 RX ADMIN — DEXAMETHASONE 10 MG: 4 TABLET ORAL at 10:42

## 2022-09-24 NOTE — ED TRIAGE NOTES
Hours ago woke with \"whistling\" in her ches. Got in shower thought it would help, it did not. Does not have inhaler for her asthma. Took xanax so that situation would not escalate.

## 2022-09-24 NOTE — ED PROVIDER NOTES
HPI   Patient is a 80-year-old female who presents with \"whistling of her chest \". Patient has a history of asthma and has been out of her medications for the last couple days. She has had some mild runny nose and sore throat. Her son has been sick over this time. She has a productive cough of clear phlegm. Denies any fever, sweats or chills. Denies any overt chest pain. This feels like her typical asthma. Past Medical History:   Diagnosis Date    ADHD     Alternating constipation and diarrhea     Anemia     Anxiety     Anxiety and depression     Asthma     Chronic cough     Chronic neck and back pain     Cough, persistent     Depression     Endometriosis     Environmental allergies     Excessive daytime sleepiness     GERD (gastroesophageal reflux disease)     GERD (gastroesophageal reflux disease)     H/O seasonal allergies     Heavy menses     Joint pain     Left-sided chest wall pain     Marijuana use     Migraines     Panic attacks     Pelvic inflammatory disease     Pelvic pain     Piercing     bilateral tragus (unable to remove jewelry)    Post partum depression     Psychiatric problem     anxiety    Rash     under buttocks & inner thighs    Seizures ([address]     remote (only once)    Sleep apnea     cpap    SOB (shortness of breath)     Suicidal ideations     Tattoos 2019    recent tattoo       Past Surgical History:   Procedure Laterality Date    HX  SECTION  05/04/2018    x1    HX DILATION AND CURETTAGE  2019    HX OTHER SURGICAL      3rd nipple removed from left side.     HX PELVIC LAPAROSCOPY  2019    diagnostic laparoscopy with lysis of adhesions and ablation of endometriosis    HX WISDOM TEETH EXTRACTION           Family History:   Problem Relation Age of Onset    Cancer Mother         brain    Asthma Mother     Thyroid Disease Mother     No Known Problems Father     Asthma Maternal Grandmother     Lupus Maternal Grandmother        Social History     Socioeconomic History Marital status: SINGLE     Spouse name: Not on file    Number of children: Not on file    Years of education: Not on file    Highest education level: Not on file   Occupational History    Not on file   Tobacco Use    Smoking status: Some Days    Smokeless tobacco: Never    Tobacco comments:     smokes weed   Substance and Sexual Activity    Alcohol use:  Yes     Alcohol/week: 2.0 - 3.0 standard drinks     Types: 2 - 3 Shots of liquor per week     Comment: liquor or wine    Drug use: Yes     Types: Marijuana     Comment: Daily    Sexual activity: Not on file   Other Topics Concern    Not on file   Social History Narrative    Not on file     Social Determinants of Health     Financial Resource Strain: Not on file   Food Insecurity: Not on file   Transportation Needs: Not on file   Physical Activity: Not on file   Stress: Not on file   Social Connections: Not on file   Intimate Partner Violence: Not on file   Housing Stability: Not on file         ALLERGIES: Adhesive, Adhesive tape-silicones, and Egg white    Review of Systems  Constitutional: No fever  HENT: No ear pain  Eyes: No change in vision  Respiratory: Positive shortness of breath  Cardio: No chest pain  GI: No blood in stool  : No hematuria  MSK: No back pain  Skin: No rashes  Neuro: No headache    Vitals:    09/24/22 1020   BP: 112/77   Pulse: 100   Resp: 18   Temp: 98.6 °F (37 °C)   SpO2: 100%   Weight: 70.8 kg (156 lb)   Height: 5' 8\" (1.727 m)            Physical Exam   General: No acute distress  Head: Normocephalic, atraumatic  Psych: Cooperative and alert  Eyes: No scleral icterus, normal conjunctiva  ENT: Moist oral mucosa  Neck: Supple  CV: Regular rate and rhythm, no pitting edema, palpable radial pulses  Pulm: Diffuse expiratory wheeze throughout, not tachypneic no respiratory  distress  GI: Normal bowel sounds, soft, non-tender  MSK: Moves all four extremities  Skin: No rashes  Neuro: Alert and conversive    [facility]    Patient a 80-year-old female who presents with acute asthma exacerbation, likely triggered by a minor viral URI. She is currently hemodynamically stable and in no respiratory distress. She will be treated with duo nebs and steroids. We will send refills of her medication that she is out of. Does not have any concerning findings of pneumonia or other significant acute process at this time. Upon examination patient that she feels much better. Still has very minimal wheezing but significant improved from previous. Patient stable for discharge at this time. Patient is in agreement with the plan to be discharged at this time. All the patient's questions were answered. Patient was given written instructions on the diagnosis, and states understanding of the plan moving forward. We did discuss important signs and symptoms that should prompt quick return to the emergency department. Disposition: Patient was discharged home in stable condition.   They will follow up with their primary care physician    Prescriptions: Dexamethasone, ProAir, inhalers    Diagnosis: Acute asthma exacerbation    Procedures

## 2022-09-24 NOTE — Clinical Note
[address] [facility] EMERGENCY DEPT  [address] [address]  [phone]    Work/School Note    Date: 9/24/2022    To Whom It May concern:      Luis Alfredo Zaldivar was seen and treated today in the emergency room by the following provider(s):  Attending Provider: Marii Brenner MD.      Luis Alfredo Zaldivar is excused from work/school on 09/24/22. She is clear to return to work/school on 09/25/22.         Sincerely,          Rajendra Morales MD

## 2022-11-04 ENCOUNTER — HOSPITAL ENCOUNTER (EMERGENCY)
Age: 27
Discharge: HOME | End: 2022-11-04
Attending: EMERGENCY MEDICINE
Payer: MEDICAID

## 2022-11-04 VITALS
HEART RATE: 102 BPM | DIASTOLIC BLOOD PRESSURE: 79 MMHG | SYSTOLIC BLOOD PRESSURE: 120 MMHG | RESPIRATION RATE: 20 BRPM | HEIGHT: 68 IN | BODY MASS INDEX: 23.64 KG/M2 | WEIGHT: 156 LBS | TEMPERATURE: 98.1 F | OXYGEN SATURATION: 95 %

## 2022-11-04 DIAGNOSIS — B34.9 VIRAL SYNDROME: Primary | ICD-10-CM

## 2022-11-04 LAB
FLUAV RNA SPEC QL NAA+PROBE: NOT DETECTED
FLUBV RNA SPEC QL NAA+PROBE: NOT DETECTED
SARS-COV-2, COV2: NOT DETECTED

## 2022-11-04 PROCEDURE — 87636 SARSCOV2 & INF A&B AMP PRB: CPT

## 2022-11-04 PROCEDURE — 99283 EMERGENCY DEPT VISIT LOW MDM: CPT

## 2022-11-04 RX ORDER — BENZONATATE 100 MG/1
100 CAPSULE ORAL
Qty: 15 CAPSULE | Refills: 0 | Status: SHIPPED | OUTPATIENT
Start: 2022-11-04 | End: 2022-11-11

## 2022-11-04 NOTE — ED PROVIDER NOTES
EMERGENCY DEPARTMENT HISTORY AND PHYSICAL EXAM    9:48 AM patient seen at this time in room QB      Date: 11/4/2022  Patient Name: Maria Esther Handley    History of Presenting Illness     Chief Complaint   Patient presents with    Wheezing         History Provided By: patient    Additional History (Context): Maria Esther Handley is a 32 y.o. female presents with 3 days of sore throat nasal congestion and headache. Does have a little bit of a cough. Previously treated for asthma. Referred here for bronchodilators and antibiotics, however does not have diabetes or other severe disease, no real chest symptoms chest tightness the cough is not prominent, and no shortness of breath. No remedies tried at home. Maryan Avalos PCP: Raguel Sandhoff, DO    Chief Complaint:   Duration:    Timing:    Location:   Quality:   Severity:   Modifying Factors:   Associated Symptoms:       Current Outpatient Medications   Medication Sig Dispense Refill    benzonatate (Tessalon Perles) 100 mg capsule Take 1 Capsule by mouth three (3) times daily as needed for Cough for up to 7 days. 15 Capsule 0    fluticasone-umeclidinium-vilanterol (Trelegy Ellipta) 100-62.5-25 mcg inhaler Take 1 Puff by inhalation daily. 60 Each 0    montelukast (SINGULAIR) 10 mg tablet Take 1 Tablet by mouth nightly. 30 Tablet 0    Symbicort 160-4.5 mcg/actuation HFAA Take 2 Puffs by inhalation daily. 10.2 g 0    albuterol (PROVENTIL HFA, VENTOLIN HFA, PROAIR HFA) 90 mcg/actuation inhaler Take 2 Puffs by inhalation every four (4) hours as needed for Wheezing. 18 g 0    dexAMETHasone (DECADRON) 6 mg tablet Take 2 tablets (12 mg) in 48 hours if continued difficulty in breathing. 2 Tablet 0    doxycycline (VIBRAMYCIN) 100 mg capsule Take 100 mg by mouth two (2) times a day.  (Patient not taking: Reported on 9/24/2022)      medroxyPROGESTERone (PROVERA) 10 mg tablet medroxyprogesterone 10 mg tablet   take 1 tablet by mouth once daily for 30 DAYS with food (Patient not taking: Reported on 9/24/2022)      ferrous sulfate 325 mg (65 mg iron) tablet Take  by mouth Daily (before breakfast). (Patient not taking: Reported on 9/24/2022)      cyanocobalamin (VITAMIN B12) 100 mcg tablet Take 100 mcg by mouth daily. (Patient not taking: Reported on 9/24/2022)      cholecalciferol, vitamin D3, (VITAMIN D3 PO) Take 5,000 Units by mouth daily. (Patient not taking: Reported on 9/24/2022)      fluticasone propionate (FLONASE) 50 mcg/actuation nasal spray 2 Sprays by Both Nostrils route daily. 1 Each 0    FLUoxetine (PROzac) 40 mg capsule Take 40 mg by mouth daily.  (Patient not taking: No sig reported)      L-Norgest&E Estradiol-E Estrad 0.15 mg-30 mcg (84)/10 mcg (7) 3MPk Ashlyna 0.15 mg-30 mcg (84)/10 mcg(7) tablets,3 month dose pack   1 tab po daily (Patient not taking: Reported on 9/24/2022)      lansoprazole (PREVACID) 30 mg capsule lansoprazole 30 mg capsule twice daily (Patient not taking: Reported on 9/24/2022)         Past History     Past Medical History:  Past Medical History:   Diagnosis Date    ADHD     Alternating constipation and diarrhea     Anemia     Anxiety     Anxiety and depression     Asthma     Chronic cough     Chronic neck and back pain     Cough, persistent     Depression     Endometriosis     Environmental allergies     Excessive daytime sleepiness     GERD (gastroesophageal reflux disease)     GERD (gastroesophageal reflux disease)     H/O seasonal allergies     Heavy menses     Joint pain     Left-sided chest wall pain     Marijuana use     Migraines     Panic attacks     Pelvic inflammatory disease     Pelvic pain     Piercing     bilateral tragus (unable to remove jewelry)    Post partum depression     Psychiatric problem     anxiety    Rash     under buttocks & inner thighs    Seizures ([address] 2011    remote (only once)    Sleep apnea     cpap    SOB (shortness of breath)     Suicidal ideations     Tattoos 05/2019    recent tattoo       Past Surgical History:  Past Surgical History:   Procedure Laterality Date    HX  SECTION  05/04/2018    x1    HX DILATION AND CURETTAGE  2019    HX OTHER SURGICAL      3rd nipple removed from left side. HX PELVIC LAPAROSCOPY  2019    diagnostic laparoscopy with lysis of adhesions and ablation of endometriosis    HX WISDOM TEETH EXTRACTION         Family History:  Family History   Problem Relation Age of Onset    Cancer Mother         brain    Asthma Mother     Thyroid Disease Mother     No Known Problems Father     Asthma Maternal Grandmother     Lupus Maternal Grandmother        Social History:  Social History     Tobacco Use    Smoking status: Some Days    Smokeless tobacco: Never    Tobacco comments:     smokes weed   Substance Use Topics    Alcohol use: Yes     Alcohol/week: 2.0 - 3.0 standard drinks     Types: 2 - 3 Shots of liquor per week     Comment: liquor or wine    Drug use: Yes     Types: Marijuana     Comment: Daily       Allergies: Allergies   Allergen Reactions    Adhesive Rash     BLISTERS AND IRRITATION WHERE ADHESIVES TOUCH SKIN    Adhesive Tape-Silicones Rash     Not allergic to silicone - ADHESIVE CAUSES SKIN IRRITATION    Egg White Other (comments)     GI distress         Review of Systems     Review of Systems   Constitutional:  Negative for diaphoresis and fever. HENT:  Positive for congestion and sore throat. Eyes:  Negative for pain and itching. Respiratory:  Negative for cough and shortness of breath. Cardiovascular:  Negative for chest pain and palpitations. Gastrointestinal:  Negative for abdominal pain and diarrhea. Endocrine: Negative for polydipsia and polyuria. Genitourinary:  Negative for dysuria and hematuria. Musculoskeletal:  Negative for arthralgias and myalgias. Skin:  Negative for rash and wound. Neurological:  Negative for seizures and syncope. Hematological:  Does not bruise/bleed easily. Psychiatric/Behavioral:  Negative for agitation and hallucinations. Physical Exam       Patient Vitals for the past 12 hrs:   Temp Pulse Resp BP SpO2   11/04/22 0918 98.1 °F (36.7 °C) (!) 102 20 120/79 95 %       IPVITALS  Patient Vitals for the past 24 hrs:   BP Temp Pulse Resp SpO2 Height Weight   11/04/22 0918 120/79 98.1 °F (36.7 °C) (!) 102 20 95 % 5' 8\" (1.727 m) 70.8 kg (156 lb)       Physical Exam  Vitals and nursing note reviewed. Constitutional:       General: She is in acute distress. Appearance: She is well-developed. She is not toxic-appearing. HENT:      Head: Normocephalic and atraumatic. Mouth/Throat:      Mouth: Mucous membranes are moist.      Pharynx: Oropharynx is clear. No oropharyngeal exudate or posterior oropharyngeal erythema. Eyes:      General: No scleral icterus. Conjunctiva/sclera: Conjunctivae normal.   Neck:      Vascular: No JVD. Cardiovascular:      Rate and Rhythm: Normal rate and regular rhythm. Heart sounds: Normal heart sounds. Comments: 4 intact extremity pulses  Pulmonary:      Effort: Pulmonary effort is normal.      Breath sounds: Normal breath sounds. Comments: Very few coughs  Abdominal:      Palpations: Abdomen is soft. There is no mass. Tenderness: There is no abdominal tenderness. Musculoskeletal:         General: Normal range of motion. Cervical back: Normal range of motion and neck supple. Lymphadenopathy:      Cervical: No cervical adenopathy. Skin:     General: Skin is warm and dry. Neurological:      Mental Status: She is alert. Diagnostic Study Results   Labs -  No results found for this or any previous visit (from the past 24 hour(s)). Radiologic Studies -   No orders to display     No results found. Medications ordered:   Medications - No data to display      Medical Decision Making   Initial Medical Decision Making and DDx:  Suspect viral illness, flu is endemic at this time. Test for coronavirus and flu and [facility]   Prescription for Radhadutch Blue, consult pharmacist for changes in cough therapies. Continue albuterol. Push fluids. Solid food not as important. Work note. Happy with the plan ready for discharge. ED Course: Progress Notes, Reevaluation, and Consults:         I am the first provider for this patient. I reviewed the vital signs, available nursing notes, past medical history, past surgical history, family history and social history. Patient Vitals for the past 12 hrs:   Temp Pulse Resp BP SpO2   11/04/22 0918 98.1 °F (36.7 °C) (!) 102 20 120/79 95 %       Vital Signs-Reviewed the patient's vital signs. Pulse Oximetry Analysis, Cardiac Monitor, 12 lead ekg:      Interpreted by the EP. Records Reviewed: Nursing notes reviewed (Time of Review: 9:48 AM)    Procedures:   Critical Care Time:   Aspirin: (was aspirin given for stroke?)    Diagnosis     Clinical Impression:   1. Viral syndrome        Disposition: Discharged      Follow-up Information       Follow up With Specialties Details Why Contact Info    Raguel Sandhoff, DO Family Medicine In 3 days  [address] [place] [address]  [phone]               Patient's Medications   Start Taking    BENZONATATE (TESSALON PERLES) 100 MG CAPSULE    Take 1 Capsule by mouth three (3) times daily as needed for Cough for up to 7 days. Continue Taking    ALBUTEROL (PROVENTIL HFA, VENTOLIN HFA, PROAIR HFA) 90 MCG/ACTUATION INHALER    Take 2 Puffs by inhalation every four (4) hours as needed for Wheezing. CHOLECALCIFEROL, VITAMIN D3, (VITAMIN D3 PO)    Take 5,000 Units by mouth daily. CYANOCOBALAMIN (VITAMIN B12) 100 MCG TABLET    Take 100 mcg by mouth daily. DEXAMETHASONE (DECADRON) 6 MG TABLET    Take 2 tablets (12 mg) in 48 hours if continued difficulty in breathing. DOXYCYCLINE (VIBRAMYCIN) 100 MG CAPSULE    Take 100 mg by mouth two (2) times a day.     FERROUS SULFATE 325 MG (65 MG IRON) TABLET    Take  by mouth Daily (before breakfast). FLUOXETINE (PROZAC) 40 MG CAPSULE    Take 40 mg by mouth daily. FLUTICASONE PROPIONATE (FLONASE) 50 MCG/ACTUATION NASAL SPRAY    2 Sprays by Both Nostrils route daily. FLUTICASONE-UMECLIDINIUM-VILANTEROL (TRELEGY ELLIPTA) 100-62.5-25 MCG INHALER    Take 1 Puff by inhalation daily. L-NORGEST&E ESTRADIOL-E ESTRAD 0.15 MG-30 MCG (84)/10 MCG (7) 3MPK    Ashlyna 0.15 mg-30 mcg (84)/10 mcg(7) tablets,3 month dose pack   1 tab po daily    LANSOPRAZOLE (PREVACID) 30 MG CAPSULE    lansoprazole 30 mg capsule twice daily    MEDROXYPROGESTERONE (PROVERA) 10 MG TABLET    medroxyprogesterone 10 mg tablet   take 1 tablet by mouth once daily for 30 DAYS with food    MONTELUKAST (SINGULAIR) 10 MG TABLET    Take 1 Tablet by mouth nightly. SYMBICORT 160-4.5 MCG/ACTUATION HFAA    Take 2 Puffs by inhalation daily.    These Medications have changed    No medications on file   Stop Taking    No medications on file     _______________________________    Notes:    Sky Medellin MD using Dragon dictation      _______________________________

## 2022-11-04 NOTE — ED TRIAGE NOTES
Pt has has been wheezing and has had cold/flu s/s for the past 8 days. Pt called PCP and was told to come in and get a neb tx and antibiotics. Pt took tylenol and used her albuterol inhaler today with little relief. Pt is A/O x 4.

## 2022-11-04 NOTE — Clinical Note
[address] [facility] EMERGENCY DEPT  [address] [address] [phone]    Work/School Note    Date: 11/4/2022    To Whom It May concern:    Leigh Ann Olson was seen and treated today in the emergency room by the following provider(s):  Attending Provider: Marce Hendrickson MD.      Leigh Ann Olson is excused from work/school on 11/04/22 and 11/05/22. She is medically clear to return to work/school on 11/6/2022.        Sincerely,          Jenny Monte MD

## 2022-12-28 ENCOUNTER — TRANSCRIBE ORDER (OUTPATIENT)
Dept: SCHEDULING | Age: 27
End: 2022-12-28

## 2022-12-28 DIAGNOSIS — E04.8 MEDIASTINAL GOITER: Primary | ICD-10-CM

## 2022-12-28 DIAGNOSIS — J01.81 OTHER ACUTE RECURRENT SINUSITIS: Primary | ICD-10-CM

## 2023-01-05 ENCOUNTER — APPOINTMENT (OUTPATIENT)
Dept: GENERAL RADIOLOGY | Age: 28
End: 2023-01-05
Attending: EMERGENCY MEDICINE
Payer: MEDICAID

## 2023-01-05 ENCOUNTER — HOSPITAL ENCOUNTER (EMERGENCY)
Age: 28
Discharge: LEFT BEFORE DISCHARGE | End: 2023-01-05
Attending: EMERGENCY MEDICINE | Admitting: EMERGENCY MEDICINE
Payer: MEDICAID

## 2023-01-05 VITALS
BODY MASS INDEX: 22.73 KG/M2 | DIASTOLIC BLOOD PRESSURE: 87 MMHG | WEIGHT: 150 LBS | HEART RATE: 96 BPM | SYSTOLIC BLOOD PRESSURE: 135 MMHG | OXYGEN SATURATION: 99 % | TEMPERATURE: 97 F | RESPIRATION RATE: 20 BRPM | HEIGHT: 68 IN

## 2023-01-05 PROCEDURE — 75810000275 HC EMERGENCY DEPT VISIT NO LEVEL OF CARE: Performed by: EMERGENCY MEDICINE

## 2023-01-13 ENCOUNTER — HOSPITAL ENCOUNTER (OUTPATIENT)
Dept: CT IMAGING | Age: 28
End: 2023-01-13
Attending: PHYSICIAN ASSISTANT
Payer: MEDICAID

## 2023-01-13 ENCOUNTER — HOSPITAL ENCOUNTER (OUTPATIENT)
Dept: ULTRASOUND IMAGING | Age: 28
Discharge: HOME | End: 2023-01-13
Attending: PHYSICIAN ASSISTANT
Payer: MEDICAID

## 2023-01-13 DIAGNOSIS — E04.8 MEDIASTINAL GOITER: ICD-10-CM

## 2023-01-13 PROCEDURE — 76536 US EXAM OF HEAD AND NECK: CPT

## 2023-01-17 ENCOUNTER — HOSPITAL ENCOUNTER (OUTPATIENT)
Dept: GENERAL RADIOLOGY | Age: 28
Discharge: HOME | End: 2023-01-17
Payer: MEDICAID

## 2023-01-17 ENCOUNTER — TRANSCRIBE ORDER (OUTPATIENT)
Dept: REGISTRATION | Age: 28
End: 2023-01-17

## 2023-01-17 DIAGNOSIS — M25.531 RIGHT WRIST PAIN: ICD-10-CM

## 2023-01-17 DIAGNOSIS — M25.531 RIGHT WRIST PAIN: Primary | ICD-10-CM

## 2023-01-17 PROCEDURE — 73110 X-RAY EXAM OF WRIST: CPT

## 2023-01-18 ENCOUNTER — HOSPITAL ENCOUNTER (OUTPATIENT)
Dept: CT IMAGING | Age: 28
Discharge: HOME | End: 2023-01-18
Attending: PHYSICIAN ASSISTANT
Payer: MEDICAID

## 2023-01-18 DIAGNOSIS — J01.81 OTHER ACUTE RECURRENT SINUSITIS: ICD-10-CM

## 2023-01-18 PROCEDURE — 70486 CT MAXILLOFACIAL W/O DYE: CPT

## 2023-02-13 ENCOUNTER — TELEPHONE (OUTPATIENT)
Facility: HOSPITAL | Age: 28
End: 2023-02-13

## 2023-02-13 NOTE — TELEPHONE ENCOUNTER
Patient cancelled appt. on 2/13/23 at 12:53pm due to the patient going to see a specialist first and she wcb to r/s if she needs to.

## 2023-03-15 ENCOUNTER — APPOINTMENT (OUTPATIENT)
Facility: HOSPITAL | Age: 28
End: 2023-03-15
Payer: MEDICAID

## 2023-03-15 ENCOUNTER — HOSPITAL ENCOUNTER (EMERGENCY)
Facility: HOSPITAL | Age: 28
Discharge: HOME | End: 2023-03-15
Attending: STUDENT IN AN ORGANIZED HEALTH CARE EDUCATION/TRAINING PROGRAM | Admitting: STUDENT IN AN ORGANIZED HEALTH CARE EDUCATION/TRAINING PROGRAM
Payer: MEDICAID

## 2023-03-15 VITALS
SYSTOLIC BLOOD PRESSURE: 135 MMHG | WEIGHT: 154 LBS | RESPIRATION RATE: 16 BRPM | DIASTOLIC BLOOD PRESSURE: 94 MMHG | HEIGHT: 68 IN | BODY MASS INDEX: 23.34 KG/M2 | HEART RATE: 71 BPM | OXYGEN SATURATION: 100 % | TEMPERATURE: 98.7 F

## 2023-03-15 DIAGNOSIS — J01.00 ACUTE MAXILLARY SINUSITIS, RECURRENCE NOT SPECIFIED: ICD-10-CM

## 2023-03-15 DIAGNOSIS — R22.0 FACIAL SWELLING: ICD-10-CM

## 2023-03-15 DIAGNOSIS — M54.2 NECK PAIN: Primary | ICD-10-CM

## 2023-03-15 LAB
ANION GAP SERPL CALC-SCNC: 5 MMOL/L (ref 3–18)
BASOPHILS # BLD: 0 K/UL (ref 0–0.1)
BASOPHILS NFR BLD: 0 % (ref 0–2)
BUN SERPL-MCNC: 7 MG/DL (ref 7–18)
BUN/CREAT SERPL: 8 (ref 12–20)
CALCIUM SERPL-MCNC: 8.5 MG/DL (ref 8.5–10.1)
CHLORIDE SERPL-SCNC: 111 MMOL/L (ref 100–111)
CO2 SERPL-SCNC: 23 MMOL/L (ref 21–32)
CREAT SERPL-MCNC: 0.89 MG/DL (ref 0.6–1.3)
DIFFERENTIAL METHOD BLD: ABNORMAL
EOSINOPHIL # BLD: 0 K/UL (ref 0–0.4)
EOSINOPHIL NFR BLD: 0 % (ref 0–5)
ERYTHROCYTE [DISTWIDTH] IN BLOOD BY AUTOMATED COUNT: 12.5 % (ref 11.6–14.5)
GLUCOSE SERPL-MCNC: 96 MG/DL (ref 74–99)
HCT VFR BLD AUTO: 40.6 % (ref 35–45)
HGB BLD-MCNC: 14 G/DL (ref 12–16)
IMM GRANULOCYTES # BLD AUTO: 0 K/UL
IMM GRANULOCYTES NFR BLD AUTO: 0 %
INR PPP: 1 (ref 0.8–1.2)
LYMPHOCYTES # BLD: 5.6 K/UL (ref 0.9–3.6)
LYMPHOCYTES NFR BLD: 34 % (ref 21–52)
MCH RBC QN AUTO: 34.5 PG (ref 24–34)
MCHC RBC AUTO-ENTMCNC: 34.5 G/DL (ref 31–37)
MCV RBC AUTO: 100 FL (ref 78–100)
MONOCYTES # BLD: 2 K/UL (ref 0.05–1.2)
MONOCYTES NFR BLD: 12 % (ref 3–10)
NEUTS SEG # BLD: 9 K/UL (ref 1.8–8)
NEUTS SEG NFR BLD: 54 % (ref 40–73)
NRBC # BLD: 0 K/UL (ref 0–0.01)
NRBC BLD-RTO: 0 PER 100 WBC
PLATELET # BLD AUTO: 268 K/UL (ref 135–420)
PLATELET COMMENT: ABNORMAL
PMV BLD AUTO: 10.2 FL (ref 9.2–11.8)
POTASSIUM SERPL-SCNC: 3.4 MMOL/L (ref 3.5–5.5)
PROTHROMBIN TIME: 13.1 SEC (ref 11.5–15.2)
RBC # BLD AUTO: 4.06 M/UL (ref 4.2–5.3)
RBC MORPH BLD: ABNORMAL
SODIUM SERPL-SCNC: 139 MMOL/L (ref 136–145)
WBC # BLD AUTO: 16.6 K/UL (ref 4.6–13.2)

## 2023-03-15 PROCEDURE — 85025 COMPLETE CBC W/AUTO DIFF WBC: CPT

## 2023-03-15 PROCEDURE — 6360000004 HC RX CONTRAST MEDICATION: Performed by: STUDENT IN AN ORGANIZED HEALTH CARE EDUCATION/TRAINING PROGRAM

## 2023-03-15 PROCEDURE — 6370000000 HC RX 637 (ALT 250 FOR IP): Performed by: EMERGENCY MEDICINE

## 2023-03-15 PROCEDURE — 99285 EMERGENCY DEPT VISIT HI MDM: CPT

## 2023-03-15 PROCEDURE — 70491 CT SOFT TISSUE NECK W/DYE: CPT

## 2023-03-15 PROCEDURE — 80048 BASIC METABOLIC PNL TOTAL CA: CPT

## 2023-03-15 PROCEDURE — 85610 PROTHROMBIN TIME: CPT

## 2023-03-15 RX ORDER — CLINDAMYCIN HYDROCHLORIDE 300 MG/1
300 CAPSULE ORAL 2 TIMES DAILY
Qty: 14 CAPSULE | Refills: 0 | Status: SHIPPED | OUTPATIENT
Start: 2023-03-15 | End: 2023-03-22

## 2023-03-15 RX ORDER — CLINDAMYCIN HYDROCHLORIDE 150 MG/1
300 CAPSULE ORAL ONCE
Status: COMPLETED | OUTPATIENT
Start: 2023-03-15 | End: 2023-03-15

## 2023-03-15 RX ORDER — DEXAMETHASONE SODIUM PHOSPHATE 10 MG/ML
10 INJECTION, SOLUTION INTRAMUSCULAR; INTRAVENOUS ONCE
OUTPATIENT
Start: 2023-03-15

## 2023-03-15 RX ADMIN — IOPAMIDOL 75 ML: 612 INJECTION, SOLUTION INTRAVENOUS at 17:45

## 2023-03-15 RX ADMIN — CLINDAMYCIN HYDROCHLORIDE 300 MG: 150 CAPSULE ORAL at 21:05

## 2023-03-15 ASSESSMENT — ENCOUNTER SYMPTOMS
NAUSEA: 0
VOMITING: 0
RHINORRHEA: 0
DIARRHEA: 0
ABDOMINAL PAIN: 0
TROUBLE SWALLOWING: 0
VOICE CHANGE: 0
COLOR CHANGE: 1
APNEA: 0
SHORTNESS OF BREATH: 0

## 2023-03-15 ASSESSMENT — PAIN - FUNCTIONAL ASSESSMENT: PAIN_FUNCTIONAL_ASSESSMENT: 0-10

## 2023-03-15 ASSESSMENT — LIFESTYLE VARIABLES
HOW MANY STANDARD DRINKS CONTAINING ALCOHOL DO YOU HAVE ON A TYPICAL DAY: PATIENT DOES NOT DRINK
HOW OFTEN DO YOU HAVE A DRINK CONTAINING ALCOHOL: NEVER
HOW OFTEN DO YOU HAVE A DRINK CONTAINING ALCOHOL: NEVER

## 2023-03-15 NOTE — ED NOTES
EMERGENCY DEPARTMENT HISTORY AND PHYSICAL EXAM      Date: 3/15/2023  Patient Name: Amira Cantor    History of Presenting Illness     Chief Complaint   Patient presents with    Post-op Problem       This is a 29year old female that had a vocal cord biopsy yesterday at [facility] for which she was given general anesthesia and intubated and later discharged home presents to the ED with neck redness and swelling that started this AM. She states she also feels flushed and hot to the touch. She denies chest pain, dyspnea, head pain, blurred vision, tinnitus, difficulty swallowing, abdominal pain, dizziness, weakness, fever, chills, nausea/vomiting/diarrhea. The history is provided by the patient. PCP: Andrea Joe, DO    No current facility-administered medications for this encounter.      Current Outpatient Medications   Medication Sig Dispense Refill    VORTIoxetine (TRINTELLIX) 5 MG tablet Take 5 mg by mouth daily      albuterol sulfate HFA (PROVENTIL;VENTOLIN;PROAIR) 108 (90 Base) MCG/ACT inhaler Inhale 2 puffs into the lungs every 4 hours as needed      budesonide-formoterol (SYMBICORT) 160-4.5 MCG/ACT AERO Inhale 2 puffs into the lungs daily      fluticasone (FLONASE) 50 MCG/ACT nasal spray 2 sprays by Nasal route daily      fluticasone-umeclidin-vilant (TRELEGY ELLIPTA) 100-62.5-25 MCG/ACT AEPB inhaler Inhale 1 puff into the lungs daily      lansoprazole (PREVACID) 30 MG delayed release capsule lansoprazole 30 mg capsule twice daily      Levonorgest-Eth Estrad 91-Day 0.15-0.03 &0.01 MG TABS Ashlyna 0.15 mg-30 mcg (84)/10 mcg(7) tablets,3 month dose pack   1 tab po daily         Past History     Past Medical History:  Past Medical History:   Diagnosis Date    ADHD     Alternating constipation and diarrhea     Anemia     Anxiety     Anxiety and depression     Asthma     Chronic cough     Chronic neck and back pain     Cough, persistent     Depression     Endometriosis     Environmental allergies Excessive daytime sleepiness     GERD (gastroesophageal reflux disease)     GERD (gastroesophageal reflux disease)     H/O seasonal allergies     Heavy menses     Joint pain     Left-sided chest wall pain     Marijuana use     Migraines     Panic attacks     Pelvic inflammatory disease     Pelvic pain     Piercing     bilateral tragus (unable to remove jewelry)    Post partum depression     Psychiatric problem     anxiety    Rash     under buttocks & inner thighs    Seizures ([address]     remote (only once)    Sleep apnea     SOB (shortness of breath)     Suicidal ideations     Tattoos 2019    recent tattoo       Past Surgical History:  Past Surgical History:   Procedure Laterality Date     SECTION  05/04/2018    x1    DILATION AND CURETTAGE OF UTERUS  2019    OTHER SURGICAL HISTORY      3rd nipple removed from left side. PELVIC LAPAROSCOPY  2019    diagnostic laparoscopy with lysis of adhesions and ablation of endometriosis    WISDOM TOOTH EXTRACTION         Family History:  Family History   Problem Relation Age of Onset    Lupus Maternal Grandmother     Cancer Mother         brain    Asthma Mother     Thyroid Disease Mother     No Known Problems Father     Asthma Maternal Grandmother        Social History:  Social History     Tobacco Use    Smoking status: Never    Smokeless tobacco: Never    Tobacco comments:     Quit smoking: smokes weed   Vaping Use    Vaping Use: Never used   Substance Use Topics    Alcohol use: Yes     Alcohol/week: 5.0 standard drinks     Types: 5 Glasses of wine per week    Drug use: Yes     Types: Marijuana [place])     Comment: daily       Allergies: Allergies   Allergen Reactions    Adhesive Tape Rash     Not allergic to silicone - ADHESIVE CAUSES SKIN IRRITATION  BLISTERS AND IRRITATION WHERE ADHESIVES TOUCH SKIN      Egg White Other (See Comments)     GI distress         Review of Systems     Review of Systems   Constitutional:  Negative for chills and fever. HENT:  Negative for rhinorrhea, tinnitus, trouble swallowing and voice change. Respiratory:  Negative for apnea and shortness of breath. Cardiovascular:  Negative for chest pain and palpitations. Gastrointestinal:  Negative for abdominal pain, diarrhea, nausea and vomiting. Genitourinary:  Negative for dysuria and urgency. Musculoskeletal:  Positive for neck pain. Negative for neck stiffness. Skin:  Positive for color change. Neurological:  Negative for weakness and headaches. Physical Exam   BP (!) 144/85   Pulse 89   Temp 98.7 °F (37.1 °C) (Oral)   Resp 18   Ht 5' 8\" (1.727 m)   Wt 154 lb (69.9 kg)   SpO2 98%   BMI 23.42 kg/m²     Physical Exam  Vitals and nursing note reviewed. Constitutional:       General: She is not in acute distress. Appearance: Normal appearance. She is not ill-appearing, toxic-appearing or diaphoretic. HENT:      Head: Normocephalic and atraumatic. Right Ear: Tympanic membrane normal. There is no impacted cerumen. Left Ear: Tympanic membrane normal. There is no impacted cerumen. Nose: Nose normal. No congestion or rhinorrhea. Mouth/Throat:      Mouth: Mucous membranes are dry. Pharynx: Oropharynx is clear. No oropharyngeal exudate or posterior oropharyngeal erythema. Eyes:      General: No scleral icterus. Pupils: Pupils are equal, round, and reactive to light. Neck:      Comments: Patient has diffuse light pink erythema to the anterior neck without definitive boarders. No evidence of raised lesion nor urticaria. The neck has appreciable tenderness to palpation bilaterally and has swelling noted along the SCMs and thyroid region L > R  Cardiovascular:      Rate and Rhythm: Normal rate and regular rhythm. Pulses: Normal pulses. Heart sounds: Normal heart sounds. No murmur heard. No friction rub. No gallop. Pulmonary:      Effort: Pulmonary effort is normal.      Breath sounds: Normal breath sounds.  No wheezing, rhonchi or rales. Abdominal:      Palpations: Abdomen is soft. Tenderness: There is no abdominal tenderness. There is no guarding. Musculoskeletal:      Cervical back: Normal range of motion and neck supple. Erythema present. Skin:     General: Skin is warm and dry. Neurological:      Mental Status: She is alert.          Diagnostic Study Results     Labs -  Recent Results (from the past 12 hour(s))   CBC with Auto Differential    Collection Time: 03/15/23  4:45 PM   Result Value Ref Range    WBC 16.6 (H) 4.6 - 13.2 K/uL    RBC 4.06 (L) 4.20 - 5.30 M/uL    Hemoglobin 14.0 12.0 - 16.0 g/dL    Hematocrit 40.6 35.0 - 45.0 %    .0 78.0 - 100.0 FL    MCH 34.5 (H) 24.0 - 34.0 PG    MCHC 34.5 31.0 - 37.0 g/dL    RDW 12.5 11.6 - 14.5 %    Platelets 044 243 - 850 K/uL    MPV 10.2 9.2 - 11.8 FL    Nucleated RBCs 0.0 0  WBC    nRBC 0.00 0.00 - 0.01 K/uL    Seg Neutrophils PENDING %    Lymphocytes PENDING %    Monocytes PENDING %    Eosinophils % PENDING %    Basophils PENDING %    Immature Granulocytes PENDING %    Segs Absolute PENDING K/UL    Absolute Lymph # PENDING K/UL    Absolute Mono # PENDING K/UL    Absolute Eos # PENDING K/UL    Basophils Absolute PENDING K/UL    Absolute Immature Granulocyte PENDING K/UL    Differential Type PENDING    Basic Metabolic Panel    Collection Time: 03/15/23  4:45 PM   Result Value Ref Range    Sodium 139 136 - 145 mmol/L    Potassium 3.4 (L) 3.5 - 5.5 mmol/L    Chloride 111 100 - 111 mmol/L    CO2 23 21 - 32 mmol/L    Anion Gap 5 3.0 - 18 mmol/L    Glucose 96 74 - 99 mg/dL    BUN 7 7.0 - 18 MG/DL    Creatinine 0.89 0.6 - 1.3 MG/DL    Bun/Cre Ratio 8 (L) 12 - 20      Est, Glom Filt Rate >60 >60 ml/min/1.73m2    Calcium 8.5 8.5 - 10.1 MG/DL   Protime-INR    Collection Time: 03/15/23  4:45 PM   Result Value Ref Range    Protime 13.1 11.5 - 15.2 sec    INR 1.0 0.8 - 1.2         Radiologic Studies -   CT SOFT TISSUE NECK W CONTRAST    (Results Pending) Medical Decision Making   I am the first provider for this patient. I reviewed the vital signs, available nursing notes, past medical history, past surgical history, family history and social history. Vital Signs-Reviewed the patient's vital signs. ED Course: Progress Notes, Reevaluation, and Consults:    Provider Notes (Medical Decision Making):       MDM  Number of Diagnoses or Management Options  Diagnosis management comments: 29year old female that had a vocal cord biopsy yesterday at [facility] that presents with neck erythema and swelling since this AM. Patient denies difficulty swallowing, dyspnea, chest pain and has ROM intact but is tender to touch with appreciable swelling L > R. Concern at this time for possible post-op bleeding, infection, other complications. Low concern for acute airway reaction due to delay in symptom onset and patient having no hx of general anesthesia rxns as well as no evidence of airway reactivity at this time including no stridor. Low concern for infection due to no fever present. Ddx: Post-op complication including bleeding and/or infection and/or damage to surrounding structures such as the trachea and esophagus. Amount and/or Complexity of Data Reviewed  Clinical lab tests: ordered                 Procedures          Diagnosis     Clinical Impression: No diagnosis found. Disposition:       Disclaimer: Sections of this note are dictated using utilizing voice recognition software. Minor typographical errors may be present. If questions arise, please do not hesitate to contact me or call our department.          Barbara Booth  03/15/23 [id number]

## 2023-03-15 NOTE — ED TRIAGE NOTES
Pt is ambulatory to triage with complaints of red skin to her face and neck, neck and abdominal soreness, tiredness, and \"not feeling myself\". Pt states yesterday she was intubated/sedated for a procedure to remove a lump from the back of her throat.

## 2023-03-16 NOTE — ED PROVIDER NOTES
6:10 pm Turnover note, patient turned over at 620 today.   Patient is status post-op biopsy biopsy of vocal cord with complaint of having a lump in her left mild left lateral anterior neck.  Follow-up CT scan ordered was negative except for left maxillary sinusitis.  PE: facial redness resolved.  (+) minimal pain over left mid SC mucle.    Reassessed patient prior to discharge.  Patient states she is feeling better and ready for discharge.  Patient does have a history of sinusitis in the past.    Diagnosis: Acute sinusitis, post-op check.    Disp:  D/C home.  F/U ENT in 24 hours.  Rx:clindamycin.  Return to ER prn.  Continue her current medication.      Neto Gregory MD  03/15/23 [id number]

## 2023-03-16 NOTE — ED NOTES
I have reviewed discharge instructions with pt, pt verbalized understanding. Pt discharged from ED ambulatory with steady gait noted, stable in no distress.         Juvencio Cristina RN  03/15/23 [id number]

## 2023-05-17 NOTE — DISCHARGE INSTRUCTIONS
Return for pain, fever not resolving with motrin or tylenol, shortness of breath, vomiting, decreased fluid intake, weakness, numbness, dizziness, or any change or concerns. Patient Education        Asthma Attack: Care Instructions  Your Care Instructions     During an asthma attack, the airways swell and narrow. This makes it hard to breathe. Severe asthma attacks can be life-threatening, but you can help prevent them by keeping your asthma under control and treating symptoms before they get bad. Symptoms include being short of breath, having chest tightness, coughing, and wheezing. Noting and treating these symptoms can also help you avoid future trips to the emergency room. The doctor has checked you carefully, but problems can develop later. If you notice any problems or new symptoms, get medical treatment right away. Follow-up care is a key part of your treatment and safety. Be sure to make and go to all appointments, and call your doctor if you are having problems. It's also a good idea to know your test results and keep a list of the medicines you take. How can you care for yourself at home? · Follow your asthma action plan to prevent and treat attacks. If you don't have an asthma action plan, work with your doctor to create one. · Take your asthma medicines exactly as prescribed. Talk to your doctor right away if you have any questions about how to take them. ? Use your quick-relief medicine when you have symptoms of an attack. Quick-relief medicine is usually an albuterol inhaler. Some people need to use quick-relief medicine before they exercise. ? Take your controller medicine every day, not just when you have symptoms. Controller medicine is usually an inhaled corticosteroid. The goal is to prevent problems before they occur. Don't use your controller medicine to treat an attack that has already started. It doesn't work fast enough to help.   ? If your doctor prescribed corticosteroid pills [FreeTextEntry1] : Plan:\par \par -Sleep Study Ordered, pt will be notified of results\par -PFT ordered, pt will be notified of results\par -Notify office immediately of any S/S of acute respiratory distress \par \par \par \par \par \par \par \par Attending:\par Agree with above.  Patient was last seen in 2017.  She then moved to the [place] and Transferred her care there.  She now lives in [place] but prefers to come here for her care.\par She has known obstructive sleep apnea, previous titration in 2017 actually required bilevel.  However she no longer even knows where the machine is and therefore requires a new study she is still very symptomatic.  Split study ordered\par Patient also has a history of cardiomyopathy.  She recently had an abnormal stress test and is going to be scheduled for cath.\par She carries an asthma dx, but prior PFT have been normal. \par PFT today again shows no evidence of asthma. Rather restriction related to body habitus\par Would not add any asthma meds\par Proceed with cardiac work up.\par Diet, weight loss, Sleep study to use during an attack, take them exactly as prescribed. It may take hours for the pills to work, but they may make the episode shorter and help you breathe better. ? Keep your quick-relief medicine with you at all times. · Talk to your doctor before using other medicines. Some medicines, such as aspirin, can cause asthma attacks in some people. · If you have a peak flow meter, use it to check how well you are breathing. This can help you predict when an asthma attack is going to occur. Then you can take medicine to prevent the asthma attack or make it less severe. · Do not smoke or allow others to smoke around you. Avoid smoky places. Smoking makes asthma worse. If you need help quitting, talk to your doctor about stop-smoking programs and medicines. These can increase your chances of quitting for good. · Learn what triggers an asthma attack for you, and avoid the triggers when you can. Common triggers include colds, smoke, air pollution, dust, pollen, mold, pets, cockroaches, stress, and cold air. · Avoid colds and the flu. Get a pneumococcal vaccine shot. If you have had one before, ask your doctor if you need a second dose. Get a flu vaccine every fall. If you must be around people with colds or the flu, wash your hands often. When should you call for help? [id number] anytime you think you may need emergency care. For example, call if:  · You have severe trouble breathing. Call your doctor now or seek immediate medical care if:  · Your symptoms do not get better after you have followed your asthma action plan. · You have new or worse trouble breathing. · Your coughing and wheezing get worse. · You cough up dark brown or bloody mucus (sputum). · You have a new or higher fever. Watch closely for changes in your health, and be sure to contact your doctor if:  · You need to use quick-relief medicine on more than 2 days a week (unless it is just for exercise).   · You cough more deeply or more often, especially if you notice more mucus or a change in the color of your mucus. · You are not getting better as expected. Where can you learn more? Go to http://www.gray.com/  Enter Z563 in the search box to learn more about \"Asthma Attack: Care Instructions. \"  Current as of: February 24, 2020               Content Version: 12.5  © 5099-6388 [employer]. Care instructions adapted under license by [employer] (which disclaims liability or warranty for this information). If you have questions about a medical condition or this instruction, always ask your healthcare professional. [place] [address] any warranty or liability for your use of this information. Patient Education        Learning About Coronavirus [phone])  Coronavirus [phone]): Overview  What is coronavirus (KDXCE-09)? The coronavirus disease (COVID-19) is caused by a virus. It is an illness that was first found in December 2019. It has since spread worldwide. The virus can cause fever, cough, and trouble breathing. In severe cases, it can cause pneumonia and make it hard to breathe without help. It can cause death. This virus spreads person-to-person through droplets from coughing and sneezing. It can also spread when you are close to someone who is infected. And it can spread when you touch something that has the virus on it, such as a doorknob or a tabletop. Coronaviruses are a large group of viruses. They cause the common cold. They also cause more serious illnesses like Middle East respiratory syndrome (MERS) and severe acute respiratory syndrome (SARS). COVID-19 is caused by a novel coronavirus. That means it's a new type that has not been seen in people before. How is COVID-19 treated?   Mild illness can be treated at home, but more serious illness needs to be treated in the hospital. Treatment may include medicines to reduce symptoms, plus breathing support such as oxygen therapy or a ventilator. Other treatments, such as antiviral medicines, may help people who have COVID-19. What can you do to protect yourself from COVID-19? The best way to protect yourself from getting sick is to:  · Avoid areas where there is an outbreak. · Avoid contact with people who may be infected. · Avoid crowds and try to stay at least 6 feet away from other people. · Wash your hands often, especially after you cough or sneeze. Use soap and water, and scrub for at least 20 seconds. If soap and water aren't available, use an alcohol-based hand . · Avoid touching your mouth, nose, and eyes. What can you do to avoid spreading the virus to others? To help avoid spreading the virus to others:  · Wash your hands often with soap or alcohol-based hand sanitizers. · Cover your mouth with a tissue when you cough or sneeze. Then throw the tissue in the trash. · Use a disinfectant to clean things that you touch often. These include doorknobs, remote controls, phones, and handles on your refrigerator and microwave. And don't forget countertops, tabletops, bathrooms, and computer keyboards. · Wear a cloth face cover if you have to go to public areas. If you know or suspect that you have COVID-19:  · Stay home. Don't go to school, work, or public areas. And don't use public transportation, ride-shares, or taxis unless you have no choice. · Leave your home only if you need to get medical care or testing. But call the doctor's office first so they know you're coming. And wear a face cover. · Limit contact with people in your home. If possible, stay in a separate bedroom and use a separate bathroom. · Wear a face cover whenever you're around other people. It can help stop the spread of the virus when you cough or sneeze. · Clean and disinfect your home every day. Use household  and disinfectant wipes or sprays. Take special care to clean things that you grab with your hands.   · Self-isolate until it's safe to be around others again. ? If you have symptoms, it's safe when you haven't had a fever for 3 days and your symptoms have improved and it's been at least 10 days since your symptoms started. ? If you were exposed to the virus but don't have symptoms, it's safe to be around others 14 days after exposure. ? Talk to your doctor about whether you also need testing, especially if you have a weakened immune system. When to call for help  Call 911 anytime you think you may need emergency care. For example, call if:  · You have severe trouble breathing. (You can't talk at all.)  · You have constant chest pain or pressure. · You are severely dizzy or lightheaded. · You are confused or can't think clearly. · Your face and lips have a blue color. · You passed out (lost consciousness) or are very hard to wake up. Call your doctor now if you develop symptoms such as:  · Shortness of breath. · Fever. · Cough. If you need to get care, call ahead to the doctor's office for instructions before you go. Make sure you wear a face cover to prevent exposing other people to the virus. Where can you get the latest information? The following health organizations are tracking and studying this virus. Their websites contain the most up-to-date information. Audrey Perez also learn what to do if you think you may have been exposed to the virus. · U.S. Centers for Disease Control and Prevention (CDC): The CDC provides updated news about the disease and travel advice. The website also tells you how to prevent the spread of infection. www.cdc.gov  · World Health Organization [facility]): WHO offers information about the virus outbreaks. WHO also has travel advice. www.who.int  Current as of: July 10, 2020               Content Version: 12.6  © 2006-2020 [employer]. Care instructions adapted under license by [employer] (which disclaims liability or warranty for this information).  If you have questions about a medical condition or this instruction, always ask your healthcare professional. [place] [address] any warranty or liability for your use of this information. Patient Education        Shortness of Breath: Care Instructions  Your Care Instructions     Shortness of breath has many causes. Sometimes conditions such as anxiety can lead to shortness of breath. Some people get mild shortness of breath when they exercise. Trouble breathing also can be a symptom of a serious problem, such as asthma, lung disease, emphysema, heart problems, and pneumonia. If your shortness of breath continues, you may need tests and treatment. Watch for any changes in your breathing and other symptoms. Follow-up care is a key part of your treatment and safety. Be sure to make and go to all appointments, and call your doctor if you are having problems. It's also a good idea to know your test results and keep a list of the medicines you take. How can you care for yourself at home? · Do not smoke or allow others to smoke around you. If you need help quitting, talk to your doctor about stop-smoking programs and medicines. These can increase your chances of quitting for good. · Get plenty of rest and sleep. · Take your medicines exactly as prescribed. Call your doctor if you think you are having a problem with your medicine. · Find healthy ways to deal with stress. ? Exercise daily. ? Get plenty of sleep. ? Eat regularly and well. When should you call for help? Call 911 anytime you think you may need emergency care. For example, call if:    · You have severe shortness of breath.     · You have symptoms of a heart attack. These may include:  ? Chest pain or pressure, or a strange feeling in the chest.  ? Sweating. ? Shortness of breath. ? Nausea or vomiting. ? Pain, pressure, or a strange feeling in the back, neck, jaw, or upper belly or in one or both shoulders or arms.   ? Lightheadedness or sudden weakness. ? A fast or irregular heartbeat. After you call 911, the  may tell you to chew 1 adult-strength or 2 to 4 low-dose aspirin. Wait for an ambulance. Do not try to drive yourself. Call your doctor now or seek immediate medical care if:    · Your shortness of breath gets worse or you start to wheeze. Wheezing is a high-pitched sound when you breathe.     · You wake up at night out of breath or have to prop your head up on several pillows to breathe.     · You are short of breath after only light activity or while at rest.   Watch closely for changes in your health, and be sure to contact your doctor if:    · You do not get better over the next 1 to 2 days. Where can you learn more? Go to http://www.gray.com/  Enter S780 in the search box to learn more about \"Shortness of Breath: Care Instructions. \"  Current as of: February 24, 2020               Content Version: 12.6  © 2006-2020 [employer]. Care instructions adapted under license by [employer] (which disclaims liability or warranty for this information). If you have questions about a medical condition or this instruction, always ask your healthcare professional. [place] [address] any warranty or liability for your use of this information.

## 2023-11-30 ENCOUNTER — HOSPITAL ENCOUNTER (EMERGENCY)
Facility: HOSPITAL | Age: 28
Discharge: HOME | End: 2023-12-01
Attending: EMERGENCY MEDICINE
Payer: MEDICAID

## 2023-11-30 VITALS
DIASTOLIC BLOOD PRESSURE: 89 MMHG | BODY MASS INDEX: 23.95 KG/M2 | OXYGEN SATURATION: 98 % | HEART RATE: 83 BPM | WEIGHT: 158 LBS | HEIGHT: 68 IN | SYSTOLIC BLOOD PRESSURE: 147 MMHG | TEMPERATURE: 98.2 F | RESPIRATION RATE: 18 BRPM

## 2023-11-30 DIAGNOSIS — J02.9 ACUTE PHARYNGITIS, UNSPECIFIED ETIOLOGY: Primary | ICD-10-CM

## 2023-11-30 LAB
FLUAV AG NPH QL IA: NEGATIVE
FLUBV AG NOSE QL IA: NEGATIVE
SARS-COV-2 RDRP RESP QL NAA+PROBE: NOT DETECTED
SOURCE: NORMAL

## 2023-11-30 PROCEDURE — 87804 INFLUENZA ASSAY W/OPTIC: CPT

## 2023-11-30 PROCEDURE — 99283 EMERGENCY DEPT VISIT LOW MDM: CPT

## 2023-11-30 PROCEDURE — 87635 SARS-COV-2 COVID-19 AMP PRB: CPT

## 2023-11-30 ASSESSMENT — PAIN DESCRIPTION - FREQUENCY: FREQUENCY: CONTINUOUS

## 2023-11-30 ASSESSMENT — PAIN DESCRIPTION - PAIN TYPE: TYPE: ACUTE PAIN

## 2023-11-30 ASSESSMENT — PAIN DESCRIPTION - LOCATION: LOCATION: EAR

## 2023-11-30 ASSESSMENT — PAIN SCALES - GENERAL: PAINLEVEL_OUTOF10: 10

## 2023-11-30 ASSESSMENT — LIFESTYLE VARIABLES
HOW MANY STANDARD DRINKS CONTAINING ALCOHOL DO YOU HAVE ON A TYPICAL DAY: PATIENT DOES NOT DRINK
HOW OFTEN DO YOU HAVE A DRINK CONTAINING ALCOHOL: NEVER

## 2023-11-30 ASSESSMENT — PAIN DESCRIPTION - ORIENTATION: ORIENTATION: LEFT

## 2023-11-30 ASSESSMENT — PAIN DESCRIPTION - DESCRIPTORS: DESCRIPTORS: ACHING;SHARP;PRESSURE

## 2023-11-30 ASSESSMENT — PAIN - FUNCTIONAL ASSESSMENT: PAIN_FUNCTIONAL_ASSESSMENT: 0-10

## 2023-12-01 PROCEDURE — 6370000000 HC RX 637 (ALT 250 FOR IP): Performed by: EMERGENCY MEDICINE

## 2023-12-01 RX ORDER — AMOXICILLIN 500 MG/1
500 CAPSULE ORAL 3 TIMES DAILY
Qty: 30 CAPSULE | Refills: 0 | Status: SHIPPED | OUTPATIENT
Start: 2023-12-01 | End: 2023-12-11

## 2023-12-01 RX ORDER — AMOXICILLIN 250 MG/1
500 CAPSULE ORAL
Status: COMPLETED | OUTPATIENT
Start: 2023-12-01 | End: 2023-12-01

## 2023-12-01 RX ORDER — IBUPROFEN 600 MG/1
600 TABLET ORAL
Status: DISCONTINUED | OUTPATIENT
Start: 2023-12-01 | End: 2023-12-01

## 2023-12-01 RX ORDER — IBUPROFEN 600 MG/1
600 TABLET ORAL
Status: COMPLETED | OUTPATIENT
Start: 2023-12-01 | End: 2023-12-01

## 2023-12-01 RX ORDER — IBUPROFEN 600 MG/1
600 TABLET ORAL 3 TIMES DAILY PRN
Qty: 30 TABLET | Refills: 0 | Status: SHIPPED | OUTPATIENT
Start: 2023-12-01

## 2023-12-01 RX ORDER — AMOXICILLIN 250 MG/1
500 CAPSULE ORAL
Status: DISCONTINUED | OUTPATIENT
Start: 2023-12-01 | End: 2023-12-01

## 2023-12-01 RX ADMIN — IBUPROFEN 600 MG: 600 TABLET, FILM COATED ORAL at 00:23

## 2023-12-01 RX ADMIN — AMOXICILLIN 500 MG: 250 CAPSULE ORAL at 00:23

## 2023-12-01 ASSESSMENT — ENCOUNTER SYMPTOMS
GASTROINTESTINAL NEGATIVE: 1
SORE THROAT: 1

## 2023-12-01 NOTE — ED PROVIDER NOTES
`[facility]  eMERGENCY dEPARTMENT eNCOUnter      Pt Name: Adilene Trinh  MRN: [id number]  [address] [facility] 1995 of evaluation: 2023  Provider:Neto Kay MD    CHIEF COMPLAINT         HPI    Adilene Trinh is a 29 y.o. female  c/o having a sore throat, left ear pain and malaise. ROS  Review of Systems   Constitutional:  Negative for fever. HENT:  Positive for ear pain (left) and sore throat. Cardiovascular: Negative. Gastrointestinal: Negative. Genitourinary: Negative. Musculoskeletal: Negative. Except as noted above the remainder of the review of systems was reviewed and negative. PAST MEDICAL HISTORY     Past Medical History:   Diagnosis Date    ADHD     Alternating constipation and diarrhea     Anemia     Anxiety     Anxiety and depression     Asthma     Chronic cough     Chronic neck and back pain     Cough, persistent     Depression     Endometriosis     Environmental allergies     Excessive daytime sleepiness     GERD (gastroesophageal reflux disease)     GERD (gastroesophageal reflux disease)     H/O seasonal allergies     Heavy menses     Joint pain     Left-sided chest wall pain     Marijuana use     Migraines     Panic attacks     Pelvic inflammatory disease     Pelvic pain     Piercing     bilateral tragus (unable to remove jewelry)    Post partum depression     Psychiatric problem     anxiety    Rash     under buttocks & inner thighs    Seizures ([address])     remote (only once)    Sleep apnea     SOB (shortness of breath)     Suicidal ideations     Tattoos 2019    recent tattoo         SURGICAL HISTORY       Past Surgical History:   Procedure Laterality Date     SECTION  05/04/2018    x1    DILATION AND CURETTAGE OF UTERUS  2019    LARYNGOSCOPY N/A 3/14/2023    MICRODIRECT LARYNGOSCOPY WITH BIOPSY performed by Sandor Mcfadden MD at [facility]      3rd nipple removed from left side.     PELVIC LAPAROSCOPY

## 2023-12-01 NOTE — ED TRIAGE NOTES
Pt c/o left ear pain, fullness, dizziness, and left side jaw pain since today. Pt stated \"that she been having recurrent sinus infection and has gotten worse\".     Past Medical History:   Diagnosis Date    ADHD     Alternating constipation and diarrhea     Anemia     Anxiety     Anxiety and depression     Asthma     Chronic cough     Chronic neck and back pain     Cough, persistent     Depression     Endometriosis     Environmental allergies     Excessive daytime sleepiness     GERD (gastroesophageal reflux disease)     GERD (gastroesophageal reflux disease)     H/O seasonal allergies     Heavy menses     Joint pain     Left-sided chest wall pain     Marijuana use     Migraines     Panic attacks     Pelvic inflammatory disease     Pelvic pain     Piercing     bilateral tragus (unable to remove jewelry)    Post partum depression     Psychiatric problem     anxiety    Rash     under buttocks & inner thighs    Seizures ([address]) 2011    remote (only once)    Sleep apnea     SOB (shortness of breath)     Suicidal ideations     Tattoos 05/2019    recent tattoo

## 2023-12-01 NOTE — ED NOTES
Administered medication and educated patient on side effects. Patient allergies verified. All questions and concerns answered. Pt instructed to use call bell at the bedside if needed. This nurse will continues to monitor pt at this time. Side rails up. Patient discharged at this time. This RN reviewed discharge instructions at this time with the patient. patient verbalized understanding and does not have any questions. Pt ambulatory upon discharge, & in stable condition. Pt armband removed & shredded.       Chuck Calderon RN  12/01/23 [id number]

## 2023-12-08 ENCOUNTER — HOSPITAL ENCOUNTER (OUTPATIENT)
Facility: HOSPITAL | Age: 28
End: 2023-12-08
Payer: MEDICAID

## 2023-12-08 DIAGNOSIS — R05.9 COUGH, UNSPECIFIED TYPE: ICD-10-CM

## 2023-12-08 PROCEDURE — 71046 X-RAY EXAM CHEST 2 VIEWS: CPT

## 2024-01-05 ENCOUNTER — OFFICE VISIT (OUTPATIENT)
Age: 29
End: 2024-01-05
Payer: MEDICAID

## 2024-01-05 ENCOUNTER — HOSPITAL ENCOUNTER (OUTPATIENT)
Facility: HOSPITAL | Age: 29
Discharge: HOME | End: 2024-01-08

## 2024-01-05 VITALS
OXYGEN SATURATION: 100 % | DIASTOLIC BLOOD PRESSURE: 91 MMHG | RESPIRATION RATE: 16 BRPM | HEIGHT: 68 IN | SYSTOLIC BLOOD PRESSURE: 109 MMHG | HEART RATE: 95 BPM | WEIGHT: 182.2 LBS | TEMPERATURE: 97.8 F | BODY MASS INDEX: 27.61 KG/M2

## 2024-01-05 DIAGNOSIS — Z13.0 SCREENING FOR ENDOCRINE, METABOLIC AND IMMUNITY DISORDER: ICD-10-CM

## 2024-01-05 DIAGNOSIS — Z13.29 SCREENING FOR ENDOCRINE, METABOLIC AND IMMUNITY DISORDER: ICD-10-CM

## 2024-01-05 DIAGNOSIS — Z13.228 SCREENING FOR ENDOCRINE, METABOLIC AND IMMUNITY DISORDER: ICD-10-CM

## 2024-01-05 DIAGNOSIS — K21.9 GASTROESOPHAGEAL REFLUX DISEASE, UNSPECIFIED WHETHER ESOPHAGITIS PRESENT: ICD-10-CM

## 2024-01-05 DIAGNOSIS — J45.909 UNCOMPLICATED ASTHMA, UNSPECIFIED ASTHMA SEVERITY, UNSPECIFIED WHETHER PERSISTENT: ICD-10-CM

## 2024-01-05 DIAGNOSIS — F41.9 ANXIETY: Primary | ICD-10-CM

## 2024-01-05 DIAGNOSIS — R53.83 OTHER FATIGUE: ICD-10-CM

## 2024-01-05 DIAGNOSIS — R10.9 ABDOMINAL PAIN, UNSPECIFIED ABDOMINAL LOCATION: ICD-10-CM

## 2024-01-05 LAB — LABCORP SPECIMEN COLLECTION: NORMAL

## 2024-01-05 PROCEDURE — 99204 OFFICE O/P NEW MOD 45 MIN: CPT | Performed by: INTERNAL MEDICINE

## 2024-01-05 SDOH — ECONOMIC STABILITY: FOOD INSECURITY: WITHIN THE PAST 12 MONTHS, YOU WORRIED THAT YOUR FOOD WOULD RUN OUT BEFORE YOU GOT MONEY TO BUY MORE.: NEVER TRUE

## 2024-01-05 SDOH — ECONOMIC STABILITY: FOOD INSECURITY: WITHIN THE PAST 12 MONTHS, THE FOOD YOU BOUGHT JUST DIDN'T LAST AND YOU DIDN'T HAVE MONEY TO GET MORE.: NEVER TRUE

## 2024-01-05 SDOH — ECONOMIC STABILITY: INCOME INSECURITY: HOW HARD IS IT FOR YOU TO PAY FOR THE VERY BASICS LIKE FOOD, HOUSING, MEDICAL CARE, AND HEATING?: NOT HARD AT ALL

## 2024-01-05 SDOH — ECONOMIC STABILITY: HOUSING INSECURITY
IN THE LAST 12 MONTHS, WAS THERE A TIME WHEN YOU DID NOT HAVE A STEADY PLACE TO SLEEP OR SLEPT IN A SHELTER (INCLUDING NOW)?: NO

## 2024-01-05 ASSESSMENT — PATIENT HEALTH QUESTIONNAIRE - PHQ9
2. FEELING DOWN, DEPRESSED OR HOPELESS: 0
SUM OF ALL RESPONSES TO PHQ QUESTIONS 1-9: 0
1. LITTLE INTEREST OR PLEASURE IN DOING THINGS: 0
SUM OF ALL RESPONSES TO PHQ QUESTIONS 1-9: 0
SUM OF ALL RESPONSES TO PHQ9 QUESTIONS 1 & 2: 0
SUM OF ALL RESPONSES TO PHQ QUESTIONS 1-9: 0
SUM OF ALL RESPONSES TO PHQ QUESTIONS 1-9: 0

## 2024-01-05 NOTE — PROGRESS NOTES
Lucía Khan presents today for   Chief Complaint   Patient presents with    Established New Doctor                 1. \"Have you been to the ER, urgent care clinic since your last visit?  Hospitalized since your last visit?\" yes    2. \"Have you seen or consulted any other health care providers outside of the [facility] since your last visit?\" [facility]      3. For patients aged 45-75: Has the patient had a colonoscopy / FIT/ Cologuard? NA - based on age      If the patient is female:    4. For patients aged 40-74: Has the patient had a mammogram within the past 2 years? No      5. For patients aged 21-65: Has the patient had a pap smear? Yes - no [place] present

## 2024-01-06 LAB
ALBUMIN SERPL-MCNC: 4.1 G/DL (ref 4–5)
ALBUMIN/GLOB SERPL: 1.5 {RATIO} (ref 1.2–2.2)
ALP SERPL-CCNC: 59 IU/L (ref 44–121)
ALT SERPL-CCNC: 11 IU/L (ref 0–32)
AST SERPL-CCNC: 13 IU/L (ref 0–40)
BILIRUB SERPL-MCNC: 0.4 MG/DL (ref 0–1.2)
BUN SERPL-MCNC: 13 MG/DL (ref 6–20)
BUN/CREAT SERPL: 20 (ref 9–23)
CALCIUM SERPL-MCNC: 9.1 MG/DL (ref 8.7–10.2)
CHLORIDE SERPL-SCNC: 105 MMOL/L (ref 96–106)
CHOLEST SERPL-MCNC: 275 MG/DL (ref 100–199)
CO2 SERPL-SCNC: 20 MMOL/L (ref 20–29)
CREAT SERPL-MCNC: 0.65 MG/DL (ref 0.57–1)
EGFRCR SERPLBLD CKD-EPI 2021: 123 ML/MIN/1.73
GLOBULIN SER CALC-MCNC: 2.7 G/DL (ref 1.5–4.5)
GLUCOSE SERPL-MCNC: 70 MG/DL (ref 70–99)
HBA1C MFR BLD: 5.5 % (ref 4.8–5.6)
HDLC SERPL-MCNC: 56 MG/DL
LDLC SERPL CALC-MCNC: 157 MG/DL (ref 0–99)
POTASSIUM SERPL-SCNC: 4.5 MMOL/L (ref 3.5–5.2)
PROT SERPL-MCNC: 6.8 G/DL (ref 6–8.5)
SODIUM SERPL-SCNC: 136 MMOL/L (ref 134–144)
T4 FREE SERPL-MCNC: 0.94 NG/DL (ref 0.82–1.77)
TRIGL SERPL-MCNC: 333 MG/DL (ref 0–149)
TSH SERPL DL<=0.005 MIU/L-ACNC: 1.76 UIU/ML (ref 0.45–4.5)
VLDLC SERPL CALC-MCNC: 62 MG/DL (ref 5–40)

## 2024-01-07 PROBLEM — F41.9 ANXIETY: Status: ACTIVE | Noted: 2024-01-07

## 2024-01-07 PROBLEM — J45.909 UNCOMPLICATED ASTHMA: Status: ACTIVE | Noted: 2024-01-07

## 2024-01-07 PROBLEM — K21.9 GASTROESOPHAGEAL REFLUX DISEASE: Status: ACTIVE | Noted: 2024-01-07

## 2024-01-07 PROBLEM — R10.9 ABDOMINAL PAIN: Status: ACTIVE | Noted: 2024-01-07

## 2024-01-07 LAB
BASOPHILS # BLD AUTO: 0.1 X10E3/UL (ref 0–0.2)
BASOPHILS NFR BLD AUTO: 1 %
EOSINOPHIL # BLD AUTO: 0.3 X10E3/UL (ref 0–0.4)
EOSINOPHIL NFR BLD AUTO: 3 %
ERYTHROCYTE [DISTWIDTH] IN BLOOD BY AUTOMATED COUNT: 12.5 % (ref 11.7–15.4)
HCT VFR BLD AUTO: 42.8 % (ref 34–46.6)
HGB BLD-MCNC: 14.3 G/DL (ref 11.1–15.9)
IMM GRANULOCYTES # BLD AUTO: 0.1 X10E3/UL (ref 0–0.1)
IMM GRANULOCYTES NFR BLD AUTO: 1 %
LYMPHOCYTES # BLD AUTO: 4.6 X10E3/UL (ref 0.7–3.1)
LYMPHOCYTES NFR BLD AUTO: 41 %
MCH RBC QN AUTO: 33.1 PG (ref 26.6–33)
MCHC RBC AUTO-ENTMCNC: 33.4 G/DL (ref 31.5–35.7)
MCV RBC AUTO: 99 FL (ref 79–97)
MONOCYTES # BLD AUTO: 0.7 X10E3/UL (ref 0.1–0.9)
MONOCYTES NFR BLD AUTO: 6 %
MORPHOLOGY BLD-IMP: ABNORMAL
NEUTROPHILS # BLD AUTO: 5.4 X10E3/UL (ref 1.4–7)
NEUTROPHILS NFR BLD AUTO: 48 %
PLATELET # BLD AUTO: 307 X10E3/UL (ref 150–450)
RBC # BLD AUTO: 4.32 X10E6/UL (ref 3.77–5.28)
WBC # BLD AUTO: 11 X10E3/UL (ref 3.4–10.8)

## 2024-01-07 ASSESSMENT — ENCOUNTER SYMPTOMS
RESPIRATORY NEGATIVE: 1
EYES NEGATIVE: 1
ALLERGIC/IMMUNOLOGIC NEGATIVE: 1

## 2024-01-09 ENCOUNTER — TELEPHONE (OUTPATIENT)
Age: 29
End: 2024-01-09

## 2024-01-09 NOTE — TELEPHONE ENCOUNTER
Please inform the patient that was negative for growth next office visit.  Patient may try to get an early appointment to discuss back pain also.    Thanks

## 2024-01-09 NOTE — TELEPHONE ENCOUNTER
Pt said she seen her Lab results on Mychart  and has concerns   She also said she is having a lot of pain on the left side of her back , she said it hurts to sit/lay down please advise

## 2024-01-18 ENCOUNTER — HOSPITAL ENCOUNTER (OUTPATIENT)
Facility: HOSPITAL | Age: 29
Discharge: HOME | End: 2024-01-18
Attending: INTERNAL MEDICINE
Payer: MEDICAID

## 2024-01-18 DIAGNOSIS — R10.9 ABDOMINAL PAIN, UNSPECIFIED ABDOMINAL LOCATION: ICD-10-CM

## 2024-01-18 PROCEDURE — 76705 ECHO EXAM OF ABDOMEN: CPT

## 2024-02-12 ENCOUNTER — HOSPITAL ENCOUNTER (EMERGENCY)
Facility: HOSPITAL | Age: 29
Discharge: HOME | End: 2024-02-12
Attending: EMERGENCY MEDICINE | Admitting: EMERGENCY MEDICINE
Payer: MEDICAID

## 2024-02-12 ENCOUNTER — APPOINTMENT (OUTPATIENT)
Facility: HOSPITAL | Age: 29
End: 2024-02-12
Payer: MEDICAID

## 2024-02-12 VITALS
TEMPERATURE: 98.6 F | OXYGEN SATURATION: 100 % | HEART RATE: 62 BPM | HEIGHT: 68 IN | BODY MASS INDEX: 27.28 KG/M2 | RESPIRATION RATE: 16 BRPM | WEIGHT: 180 LBS | SYSTOLIC BLOOD PRESSURE: 122 MMHG | DIASTOLIC BLOOD PRESSURE: 79 MMHG

## 2024-02-12 DIAGNOSIS — K58.2 IRRITABLE BOWEL SYNDROME WITH BOTH CONSTIPATION AND DIARRHEA: Primary | ICD-10-CM

## 2024-02-12 DIAGNOSIS — R11.2 NAUSEA AND VOMITING, UNSPECIFIED VOMITING TYPE: ICD-10-CM

## 2024-02-12 DIAGNOSIS — R10.12 LEFT UPPER QUADRANT ABDOMINAL PAIN: ICD-10-CM

## 2024-02-12 DIAGNOSIS — R10.32 ABDOMINAL PAIN, LEFT LOWER QUADRANT: ICD-10-CM

## 2024-02-12 LAB
ALBUMIN SERPL-MCNC: 3.8 G/DL (ref 3.4–5)
ALBUMIN/GLOB SERPL: 1 (ref 0.8–1.7)
ALP SERPL-CCNC: 77 U/L (ref 45–117)
ALT SERPL-CCNC: 15 U/L (ref 13–56)
ANION GAP SERPL CALC-SCNC: 2 MMOL/L (ref 3–18)
APPEARANCE UR: ABNORMAL
AST SERPL-CCNC: 18 U/L (ref 10–38)
BACTERIA URNS QL MICRO: NEGATIVE /HPF
BASOPHILS # BLD: 0 K/UL (ref 0–0.1)
BASOPHILS NFR BLD: 0 % (ref 0–2)
BILIRUB SERPL-MCNC: 0.8 MG/DL (ref 0.2–1)
BILIRUB UR QL: NEGATIVE
BUN SERPL-MCNC: 12 MG/DL (ref 7–18)
BUN/CREAT SERPL: 16 (ref 12–20)
CALCIUM SERPL-MCNC: 9.1 MG/DL (ref 8.5–10.1)
CHLORIDE SERPL-SCNC: 110 MMOL/L (ref 100–111)
CO2 SERPL-SCNC: 25 MMOL/L (ref 21–32)
COLOR UR: YELLOW
CREAT SERPL-MCNC: 0.76 MG/DL (ref 0.6–1.3)
DIFFERENTIAL METHOD BLD: ABNORMAL
EOSINOPHIL # BLD: 0.1 K/UL (ref 0–0.4)
EOSINOPHIL NFR BLD: 1 % (ref 0–5)
EPITH CASTS URNS QL MICRO: NORMAL /LPF (ref 0–5)
ERYTHROCYTE [DISTWIDTH] IN BLOOD BY AUTOMATED COUNT: 13.2 % (ref 11.6–14.5)
GLOBULIN SER CALC-MCNC: 3.8 G/DL (ref 2–4)
GLUCOSE SERPL-MCNC: 95 MG/DL (ref 74–99)
GLUCOSE UR STRIP.AUTO-MCNC: NEGATIVE MG/DL
HCG UR QL: NEGATIVE
HCT VFR BLD AUTO: 42.3 % (ref 35–45)
HGB BLD-MCNC: 14.7 G/DL (ref 12–16)
HGB UR QL STRIP: NEGATIVE
IMM GRANULOCYTES # BLD AUTO: 0 K/UL (ref 0–0.04)
IMM GRANULOCYTES NFR BLD AUTO: 0 % (ref 0–0.5)
KETONES UR QL STRIP.AUTO: ABNORMAL MG/DL
LEUKOCYTE ESTERASE UR QL STRIP.AUTO: ABNORMAL
LIPASE SERPL-CCNC: 24 U/L (ref 13–75)
LYMPHOCYTES # BLD: 2.9 K/UL (ref 0.9–3.6)
LYMPHOCYTES NFR BLD: 29 % (ref 21–52)
MCH RBC QN AUTO: 34.1 PG (ref 24–34)
MCHC RBC AUTO-ENTMCNC: 34.8 G/DL (ref 31–37)
MCV RBC AUTO: 98.1 FL (ref 78–100)
MONOCYTES # BLD: 0.8 K/UL (ref 0.05–1.2)
MONOCYTES NFR BLD: 8 % (ref 3–10)
NEUTS SEG # BLD: 5 K/UL (ref 1.8–8)
NEUTS SEG NFR BLD: 51 % (ref 40–73)
NITRITE UR QL STRIP.AUTO: NEGATIVE
NRBC # BLD: 0 K/UL (ref 0–0.01)
NRBC BLD-RTO: 0 PER 100 WBC
OTHER CELLS NFR BLD MANUAL: 11
PH UR STRIP: 6 (ref 5–8)
PLATELET # BLD AUTO: 309 K/UL (ref 135–420)
PLATELET COMMENT: ABNORMAL
PMV BLD AUTO: 9.9 FL (ref 9.2–11.8)
POTASSIUM SERPL-SCNC: 4.3 MMOL/L (ref 3.5–5.5)
PROT SERPL-MCNC: 7.6 G/DL (ref 6.4–8.2)
PROT UR STRIP-MCNC: NEGATIVE MG/DL
RBC # BLD AUTO: 4.31 M/UL (ref 4.2–5.3)
RBC #/AREA URNS HPF: NEGATIVE /HPF (ref 0–5)
RBC MORPH BLD: ABNORMAL
SODIUM SERPL-SCNC: 137 MMOL/L (ref 136–145)
SP GR UR REFRACTOMETRY: 1.02 (ref 1–1.03)
UROBILINOGEN UR QL STRIP.AUTO: 1 EU/DL (ref 0.2–1)
WBC # BLD AUTO: 9.9 K/UL (ref 4.6–13.2)
WBC URNS QL MICRO: NORMAL /HPF (ref 0–4)

## 2024-02-12 PROCEDURE — 96374 THER/PROPH/DIAG INJ IV PUSH: CPT

## 2024-02-12 PROCEDURE — 74177 CT ABD & PELVIS W/CONTRAST: CPT

## 2024-02-12 PROCEDURE — 6360000004 HC RX CONTRAST MEDICATION: Performed by: EMERGENCY MEDICINE

## 2024-02-12 PROCEDURE — 81025 URINE PREGNANCY TEST: CPT

## 2024-02-12 PROCEDURE — 85025 COMPLETE CBC W/AUTO DIFF WBC: CPT

## 2024-02-12 PROCEDURE — A4216 STERILE WATER/SALINE, 10 ML: HCPCS | Performed by: EMERGENCY MEDICINE

## 2024-02-12 PROCEDURE — 83690 ASSAY OF LIPASE: CPT

## 2024-02-12 PROCEDURE — 6360000002 HC RX W HCPCS: Performed by: EMERGENCY MEDICINE

## 2024-02-12 PROCEDURE — 2580000003 HC RX 258

## 2024-02-12 PROCEDURE — 96361 HYDRATE IV INFUSION ADD-ON: CPT

## 2024-02-12 PROCEDURE — C9113 INJ PANTOPRAZOLE SODIUM, VIA: HCPCS | Performed by: EMERGENCY MEDICINE

## 2024-02-12 PROCEDURE — 6370000000 HC RX 637 (ALT 250 FOR IP)

## 2024-02-12 PROCEDURE — 6370000000 HC RX 637 (ALT 250 FOR IP): Performed by: EMERGENCY MEDICINE

## 2024-02-12 PROCEDURE — 80053 COMPREHEN METABOLIC PANEL: CPT

## 2024-02-12 PROCEDURE — 2580000003 HC RX 258: Performed by: EMERGENCY MEDICINE

## 2024-02-12 PROCEDURE — 81001 URINALYSIS AUTO W/SCOPE: CPT

## 2024-02-12 PROCEDURE — 99285 EMERGENCY DEPT VISIT HI MDM: CPT

## 2024-02-12 RX ORDER — ONDANSETRON 4 MG/1
4 TABLET, ORALLY DISINTEGRATING ORAL
Status: COMPLETED | OUTPATIENT
Start: 2024-02-12 | End: 2024-02-12

## 2024-02-12 RX ORDER — SODIUM CHLORIDE, SODIUM LACTATE, POTASSIUM CHLORIDE, AND CALCIUM CHLORIDE .6; .31; .03; .02 G/100ML; G/100ML; G/100ML; G/100ML
1000 INJECTION, SOLUTION INTRAVENOUS ONCE
Status: COMPLETED | OUTPATIENT
Start: 2024-02-12 | End: 2024-02-12

## 2024-02-12 RX ORDER — ALPRAZOLAM 1 MG/1
1 TABLET ORAL NIGHTLY PRN
COMMUNITY

## 2024-02-12 RX ADMIN — PANTOPRAZOLE SODIUM 40 MG: 40 INJECTION, POWDER, FOR SOLUTION INTRAVENOUS at 19:07

## 2024-02-12 RX ADMIN — ALUMINUM HYDROXIDE AND MAGNESIUM HYDROXIDE 30 ML: 200; 200 SUSPENSION ORAL at 19:06

## 2024-02-12 RX ADMIN — IOPAMIDOL 100 ML: 612 INJECTION, SOLUTION INTRAVENOUS at 18:17

## 2024-02-12 RX ADMIN — SODIUM CHLORIDE, POTASSIUM CHLORIDE, SODIUM LACTATE AND CALCIUM CHLORIDE 1000 ML: 600; 310; 30; 20 INJECTION, SOLUTION INTRAVENOUS at 15:35

## 2024-02-12 RX ADMIN — ONDANSETRON 4 MG: 4 TABLET, ORALLY DISINTEGRATING ORAL at 15:35

## 2024-02-12 NOTE — DISCHARGE INSTRUCTIONS
Please follow up with your GI doctor as soon as possible. See the information included here -- I believe you have irritable bowel syndrome. This can be treated w/ dietary changes and bowel routine. It sound like your GI team has ordered some studies, try to get these soon.    I would try to keep a food diary and follow this FODMAP diet -- you'd be surprised what can significant impact your pain!     Your GI doc has prescribed dicyclomine 20 mg 4 times daily, omeprazole each morning, and as needed zofran  for nausea. Please pick these up if you can tonight.    Give over the counter miralax a try for constipation. You may be crampy at first but you need to have a soft BM every day. Diarrhea can actually be a sign of constipation and they worsen each other! You can use over the counter gas-x (simethicone) for cramps.     Return to ED if you have significant worsening of pain, intractable vomiting, profuse diarrhea, fever w/ temp >100.4, are unable to eat or drink, chest pain, or shortness of breath.    Follow up with your PCP and/or GI doctor within 1 week. We hope you feel better!

## 2024-02-12 NOTE — ED TRIAGE NOTES
Patient presents to ER after having severe abd pain for past 3 weeks. Patient states was seen by GI specialist @ [facility] last week.

## 2024-02-12 NOTE — ED PROVIDER NOTES
provider for this patient.    I reviewed the vital signs, available nursing notes, past medical history, past surgical history, family history and social history.    Vital Signs-Reviewed the patient's vital signs.    EKG: not indicated    Records Reviewed: upon arrival    ED Course: Progress Notes, Reevaluation, and Consults:    ED Course as of 02/15/24 0018   Mon Feb 12, 2024   1518 Leading dx: IBS, consider new onset chrons or UC, supposed to get stool studis outpatient,  reflux   following GI   Has not tried bentyl yet  Zofran helps nausa  Reports poor PO intake   Consider fluids, zofran, bentyl  Low suspicion for c. Diff  No peritoneal signs  [RB]   1547 No leukocytosis or anemia [RB]   1548 Giving 1L LR and zofran [RB]   1553 CMP and lipase WNL [RB]   1742 UA negative for UTI.  HCG negative.  VS remain stable. [RB]   1908 CT ABDOMEN PELVIS W IV CONTRAST Additional Contrast? None [RB]   1909 CT abd/pelvis w/ no acute findings  S/p protonix and maalox [RB]      ED Course User Index  [RB] Valerie Molina P, DO       Provider Notes (Medical Decision Making):     Worsening abdominal pain in setting of 3 days, constipation, left lower quadrant pain, crampy, emesis.  Established with GI, has not yet been worked up for etiologies.  Nonspecific watery diarrhea and nausea with reflux since November after diagnosis of mono.  Left upper quadrant ultrasound in January normal, last CT abdomen pelvis years ago, nonspecific enteritis.  Vitals reassuring, afebrile.  Mildly tachy.  Negative pregnancy test, unremarkable UA.  CBC, CMP, lipase normal.  CT abdomen pelvis normal.  Received 1 L LR, Zofran, Protonix, and Mylanta.  Rested and felt somewhat better.  Ambulatory and stable, planning to  Bentyl, Zofran, omeprazole from the pharmacy.     Leading differential: mixed IBS w/ gas pain and chronic reflux worsened by recent poor p.o. intake due to abdominal pain. No acute abdomen, exam nonspecific LLQ ttp w/o peritoneal

## 2024-02-15 PROBLEM — R10.32 ABDOMINAL PAIN, LEFT LOWER QUADRANT: Status: ACTIVE | Noted: 2024-02-15

## 2024-02-28 NOTE — ED TRIAGE NOTES
Patient states being advised by [address] [place] [address] to report to ER for further evaluation for suspected Flu or Covid. She states symptom began on Thursday. C/o chills, sweats, cough, myalgias, fatigue, shortness of breath and intermittent confusion. She states hx of Asthma and seasonal allergies. COUGH

## 2024-03-15 ENCOUNTER — HOSPITAL ENCOUNTER (EMERGENCY)
Facility: HOSPITAL | Age: 29
Discharge: HOME | End: 2024-03-15
Payer: MEDICAID

## 2024-03-15 VITALS
BODY MASS INDEX: 25.76 KG/M2 | TEMPERATURE: 98.3 F | WEIGHT: 170 LBS | HEIGHT: 68 IN | OXYGEN SATURATION: 100 % | SYSTOLIC BLOOD PRESSURE: 131 MMHG | RESPIRATION RATE: 16 BRPM | DIASTOLIC BLOOD PRESSURE: 97 MMHG | HEART RATE: 84 BPM

## 2024-03-15 DIAGNOSIS — K29.00 OTHER ACUTE GASTRITIS WITHOUT HEMORRHAGE: ICD-10-CM

## 2024-03-15 DIAGNOSIS — R11.2 NAUSEA AND VOMITING, UNSPECIFIED VOMITING TYPE: Primary | ICD-10-CM

## 2024-03-15 LAB
ALBUMIN SERPL-MCNC: 4.1 G/DL (ref 3.4–5)
ALBUMIN/GLOB SERPL: 1 (ref 0.8–1.7)
ALP SERPL-CCNC: 99 U/L (ref 45–117)
ALT SERPL-CCNC: 29 U/L (ref 13–56)
ANION GAP SERPL CALC-SCNC: 4 MMOL/L (ref 3–18)
APPEARANCE UR: ABNORMAL
AST SERPL-CCNC: 19 U/L (ref 10–38)
BACTERIA URNS QL MICRO: ABNORMAL /HPF
BASOPHILS # BLD: 0 K/UL (ref 0–0.1)
BASOPHILS NFR BLD: 0 % (ref 0–2)
BILIRUB SERPL-MCNC: 0.3 MG/DL (ref 0.2–1)
BILIRUB UR QL: NEGATIVE
BUN SERPL-MCNC: 8 MG/DL (ref 7–18)
BUN/CREAT SERPL: 10 (ref 12–20)
CALCIUM SERPL-MCNC: 9.2 MG/DL (ref 8.5–10.1)
CHLORIDE SERPL-SCNC: 109 MMOL/L (ref 100–111)
CO2 SERPL-SCNC: 28 MMOL/L (ref 21–32)
COLOR UR: YELLOW
CREAT SERPL-MCNC: 0.77 MG/DL (ref 0.6–1.3)
DIFFERENTIAL METHOD BLD: ABNORMAL
EOSINOPHIL # BLD: 0 K/UL (ref 0–0.4)
EOSINOPHIL NFR BLD: 0 % (ref 0–5)
EPITH CASTS URNS QL MICRO: ABNORMAL /LPF (ref 0–5)
ERYTHROCYTE [DISTWIDTH] IN BLOOD BY AUTOMATED COUNT: 12.8 % (ref 11.6–14.5)
GLOBULIN SER CALC-MCNC: 4.1 G/DL (ref 2–4)
GLUCOSE SERPL-MCNC: 97 MG/DL (ref 74–99)
GLUCOSE UR STRIP.AUTO-MCNC: NEGATIVE MG/DL
HCG UR QL: NEGATIVE
HCT VFR BLD AUTO: 44.8 % (ref 35–45)
HGB BLD-MCNC: 15.7 G/DL (ref 12–16)
HGB UR QL STRIP: NEGATIVE
IMM GRANULOCYTES # BLD AUTO: 0 K/UL
IMM GRANULOCYTES NFR BLD AUTO: 0 %
KETONES UR QL STRIP.AUTO: NEGATIVE MG/DL
LEUKOCYTE ESTERASE UR QL STRIP.AUTO: NEGATIVE
LIPASE SERPL-CCNC: 34 U/L (ref 13–75)
LYMPHOCYTES # BLD: 4.8 K/UL (ref 0.9–3.6)
LYMPHOCYTES NFR BLD: 38 % (ref 21–52)
MCH RBC QN AUTO: 34.4 PG (ref 24–34)
MCHC RBC AUTO-ENTMCNC: 35 G/DL (ref 31–37)
MCV RBC AUTO: 98.2 FL (ref 78–100)
MONOCYTES # BLD: 1 K/UL (ref 0.05–1.2)
MONOCYTES NFR BLD: 8 % (ref 3–10)
MUCOUS THREADS URNS QL MICRO: ABNORMAL /LPF
NEUTS SEG # BLD: 6.8 K/UL (ref 1.8–8)
NEUTS SEG NFR BLD: 54 % (ref 40–73)
NITRITE UR QL STRIP.AUTO: NEGATIVE
NRBC # BLD: 0 K/UL (ref 0–0.01)
NRBC BLD-RTO: 0 PER 100 WBC
PH UR STRIP: 5.5 (ref 5–8)
PLATELET # BLD AUTO: 265 K/UL (ref 135–420)
PLATELET COMMENT: ABNORMAL
PMV BLD AUTO: 9.8 FL (ref 9.2–11.8)
POTASSIUM SERPL-SCNC: 4.3 MMOL/L (ref 3.5–5.5)
PROT SERPL-MCNC: 8.2 G/DL (ref 6.4–8.2)
PROT UR STRIP-MCNC: ABNORMAL MG/DL
RBC # BLD AUTO: 4.56 M/UL (ref 4.2–5.3)
RBC #/AREA URNS HPF: ABNORMAL /HPF (ref 0–5)
RBC MORPH BLD: ABNORMAL
SODIUM SERPL-SCNC: 141 MMOL/L (ref 136–145)
SP GR UR REFRACTOMETRY: 1.02 (ref 1–1.03)
UROBILINOGEN UR QL STRIP.AUTO: 1 EU/DL (ref 0.2–1)
WBC # BLD AUTO: 12.6 K/UL (ref 4.6–13.2)
WBC URNS QL MICRO: ABNORMAL /HPF (ref 0–4)

## 2024-03-15 PROCEDURE — 81001 URINALYSIS AUTO W/SCOPE: CPT

## 2024-03-15 PROCEDURE — 83690 ASSAY OF LIPASE: CPT

## 2024-03-15 PROCEDURE — 81025 URINE PREGNANCY TEST: CPT

## 2024-03-15 PROCEDURE — 2580000003 HC RX 258: Performed by: PHYSICIAN ASSISTANT

## 2024-03-15 PROCEDURE — 85025 COMPLETE CBC W/AUTO DIFF WBC: CPT

## 2024-03-15 PROCEDURE — 99284 EMERGENCY DEPT VISIT MOD MDM: CPT

## 2024-03-15 PROCEDURE — 80053 COMPREHEN METABOLIC PANEL: CPT

## 2024-03-15 RX ORDER — PROMETHAZINE HYDROCHLORIDE 25 MG/1
25 TABLET ORAL EVERY 8 HOURS PRN
Qty: 21 TABLET | Refills: 0 | Status: SHIPPED | OUTPATIENT
Start: 2024-03-15 | End: 2024-03-22

## 2024-03-15 RX ORDER — 0.9 % SODIUM CHLORIDE 0.9 %
1000 INTRAVENOUS SOLUTION INTRAVENOUS ONCE
Status: COMPLETED | OUTPATIENT
Start: 2024-03-15 | End: 2024-03-15

## 2024-03-15 RX ADMIN — SODIUM CHLORIDE 1000 ML: 9 INJECTION, SOLUTION INTRAVENOUS at 12:35

## 2024-03-15 ASSESSMENT — PAIN SCALES - GENERAL: PAINLEVEL_OUTOF10: 3

## 2024-03-15 ASSESSMENT — PAIN - FUNCTIONAL ASSESSMENT: PAIN_FUNCTIONAL_ASSESSMENT: 0-10

## 2024-03-15 ASSESSMENT — ENCOUNTER SYMPTOMS
NAUSEA: 1
WHEEZING: 0
BACK PAIN: 0
EYE REDNESS: 0
CONSTIPATION: 0
ABDOMINAL PAIN: 0
VOMITING: 1
SHORTNESS OF BREATH: 0
DIARRHEA: 0
RHINORRHEA: 0
EYE DISCHARGE: 0
SORE THROAT: 0
COUGH: 0

## 2024-03-15 ASSESSMENT — LIFESTYLE VARIABLES
HOW OFTEN DO YOU HAVE A DRINK CONTAINING ALCOHOL: MONTHLY OR LESS
HOW MANY STANDARD DRINKS CONTAINING ALCOHOL DO YOU HAVE ON A TYPICAL DAY: 1 OR 2

## 2024-03-15 ASSESSMENT — PAIN DESCRIPTION - LOCATION: LOCATION: ABDOMEN

## 2024-03-15 NOTE — ED PROVIDER NOTES
diagnosis, treatment, and plan.  She verbally conveys understanding and agreement of the signs, symptoms, diagnosis, treatment and prognosis and additionally agrees to follow up as discussed.  She also agrees with the care-plan and conveys that all of her questions have been answered.  I have also provided discharge instructions for her that include: educational information regarding their diagnosis and treatment, and list of reasons why they would want to return to the ED prior to their follow-up appointment, should her condition change.     FINAL IMPRESSION      1. Nausea and vomiting, unspecified vomiting type    2. Other acute gastritis without hemorrhage            DISPOSITION/PLAN   DISPOSITION Decision To Discharge 03/15/2024 01:39:41 PM       Medication List        START taking these medications      promethazine 25 MG tablet  Commonly known as: PHENERGAN  Take 1 tablet by mouth every 8 hours as needed for Nausea            ASK your doctor about these medications      albuterol sulfate  (90 Base) MCG/ACT inhaler  Commonly known as: PROVENTIL;VENTOLIN;PROAIR  Inhale 2 puffs into the lungs every 4 hours as needed for Wheezing     ALPRAZolam 1 MG tablet  Commonly known as: XANAX     fluticasone 50 MCG/ACT nasal spray  Commonly known as: FLONASE     ibuprofen 600 MG tablet  Commonly known as: ADVIL;MOTRIN  Take 1 tablet by mouth 3 times daily as needed for Pain     lansoprazole 30 MG delayed release capsule  Commonly known as: PREVACID     Levonorgest-Eth Estrad 91-Day 0.15-0.03 &0.01 MG Tabs     Trintellix 5 MG tablet  Generic drug: VORTIoxetine               Where to Get Your Medications        These medications were sent to [id number] - [place], [place] - [address] [place] [address] - P [phone] - F [phone]  [address] [place] [id number]      Phone: [phone]   promethazine 25 MG tablet          PATIENT REFERRED TO:  Donovan Ace MD  [address] [facility]

## 2024-03-15 NOTE — ED TRIAGE NOTES
Pt to ED for eval of blood in emesis, abdominal pain. Pt reports she has been vomiting since November, has seen multiple specialists for diagnosis. Per photo on pt phone of emesis, it is not bright red, no clots. Pt states she believes her headache is from going out last night.

## 2024-03-15 NOTE — DISCHARGE INSTRUCTIONS
Drink a lot of water. F/U with your PCP, OB/GYN, Gastroenterologist and make an appointment with Urology. Avoid acidic foods and alcohol.

## 2024-06-08 ENCOUNTER — HOSPITAL ENCOUNTER (EMERGENCY)
Facility: HOSPITAL | Age: 29
Discharge: HOME | End: 2024-06-08
Attending: STUDENT IN AN ORGANIZED HEALTH CARE EDUCATION/TRAINING PROGRAM
Payer: MEDICAID

## 2024-06-08 VITALS
WEIGHT: 168 LBS | OXYGEN SATURATION: 99 % | DIASTOLIC BLOOD PRESSURE: 73 MMHG | SYSTOLIC BLOOD PRESSURE: 129 MMHG | HEIGHT: 68 IN | HEART RATE: 93 BPM | TEMPERATURE: 98.3 F | RESPIRATION RATE: 20 BRPM | BODY MASS INDEX: 25.46 KG/M2

## 2024-06-08 DIAGNOSIS — J40 BRONCHITIS: Primary | ICD-10-CM

## 2024-06-08 PROCEDURE — 99283 EMERGENCY DEPT VISIT LOW MDM: CPT

## 2024-06-08 RX ORDER — PREDNISONE 20 MG/1
40 TABLET ORAL DAILY
Qty: 10 TABLET | Refills: 0 | Status: SHIPPED | OUTPATIENT
Start: 2024-06-08 | End: 2024-06-13

## 2024-06-08 RX ORDER — MONTELUKAST SODIUM 10 MG/1
10 TABLET ORAL NIGHTLY
Qty: 90 TABLET | Refills: 1 | Status: SHIPPED | OUTPATIENT
Start: 2024-06-08

## 2024-06-08 RX ORDER — ALBUTEROL SULFATE 90 UG/1
2 AEROSOL, METERED RESPIRATORY (INHALATION) EVERY 4 HOURS PRN
Qty: 18 G | Refills: 2 | Status: SHIPPED | OUTPATIENT
Start: 2024-06-08

## 2024-06-08 ASSESSMENT — LIFESTYLE VARIABLES
HOW OFTEN DO YOU HAVE A DRINK CONTAINING ALCOHOL: 2-4 TIMES A MONTH
HOW MANY STANDARD DRINKS CONTAINING ALCOHOL DO YOU HAVE ON A TYPICAL DAY: 1 OR 2

## 2024-06-08 ASSESSMENT — PAIN - FUNCTIONAL ASSESSMENT: PAIN_FUNCTIONAL_ASSESSMENT: NONE - DENIES PAIN

## 2024-06-08 NOTE — ED PROVIDER NOTES
and back pain     Cough, persistent     Depression     Endometriosis     Environmental allergies     Excessive daytime sleepiness     GERD (gastroesophageal reflux disease)     GERD (gastroesophageal reflux disease)     H/O seasonal allergies     Heavy menses     Joint pain     Left-sided chest wall pain     Marijuana use     Migraines     Panic attacks     Pelvic inflammatory disease     Pelvic pain     Piercing     bilateral tragus (unable to remove jewelry)    Post partum depression     Psychiatric problem     anxiety    Rash     under buttocks & inner thighs    Seizures (HCC)     remote (only once)    Sleep apnea     SOB (shortness of breath)     Suicidal ideations     Tattoos 2019    recent tattoo         SURGICAL HISTORY       Past Surgical History:   Procedure Laterality Date     SECTION  05/04/2018    x1    DILATION AND CURETTAGE OF UTERUS  2019    LARYNGOSCOPY N/A 3/14/2023    MICRODIRECT LARYNGOSCOPY WITH BIOPSY performed by Chito Waterman MD at [facility] MAIN OR    OTHER SURGICAL HISTORY      3rd nipple removed from left side.    PELVIC LAPAROSCOPY  2019    diagnostic laparoscopy with lysis of adhesions and ablation of endometriosis    WISDOM TOOTH EXTRACTION           CURRENT MEDICATIONS       Previous Medications    ALPRAZOLAM (XANAX) 1 MG TABLET    Take 1 tablet by mouth nightly as needed for Sleep. Max Daily Amount: 1 mg    FLUTICASONE (FLONASE) 50 MCG/ACT NASAL SPRAY    2 sprays by Nasal route daily    IBUPROFEN (ADVIL;MOTRIN) 600 MG TABLET    Take 1 tablet by mouth 3 times daily as needed for Pain    LANSOPRAZOLE (PREVACID) 30 MG DELAYED RELEASE CAPSULE    lansoprazole 30 mg capsule twice daily    LEVONORGEST-ETH ESTRAD -DAY 0.15-0.03 &0.01 MG TABS    Ashlyna 0.15 mg-30 mcg (84)/10 mcg(7) tablets,3 month dose pack   1 tab po daily    VORTIOXETINE (TRINTELLIX) 5 MG TABLET    Take 1 tablet by mouth daily       ALLERGIES     Adhesive tape, Egg white, Egg-derived products, and  additional round of steroids she will refill her Singulair and albuterol inhaler.  Discussed close follow-up with her pulmonologist and PCP.  Also discussed treating her postnasal drip and acid reflux, to see if this contributing to her chronic cough.    Patient stable for discharge at this time.  Patient is in agreement with the plan to be discharged at this time.  All the patient's questions were answered.  Patient was given written instructions on the diagnosis, and states understanding of the plan moving forward.  We did discuss important signs and symptoms that should prompt quick return to the emergency department.         CRITICAL CARE TIME   Total Critical Care time was 0 minutes, excluding separately reportable procedures.  There was a high probability of clinically significant/life threatening deterioration in the patient's condition which required my urgent intervention.     CONSULTS:  None    PROCEDURES:  Unless otherwise noted below, none     Procedures      FINAL IMPRESSION      1. Bronchitis          DISPOSITION/PLAN   DISPOSITION Decision To Discharge 06/08/2024 04:39:24 PM      PATIENT REFERRED TO:  Donovan Ace MD  [address] [address]  [phone]    Schedule an appointment as soon as possible for a visit       your pulmonolgist    Schedule an appointment as soon as possible for a visit         DISCHARGE MEDICATIONS:  New Prescriptions    MONTELUKAST (SINGULAIR) 10 MG TABLET    Take 1 tablet by mouth nightly    PREDNISONE (DELTASONE) 20 MG TABLET    Take 2 tablets by mouth daily for 5 days     Controlled Substances Monitoring:          No data to display                (Please note that portions of this note were completed with a voice recognition program.  Efforts were made to edit the dictations but occasionally words are mis-transcribed.)    Rosamaria Gonzalez MD (electronically signed)  Attending Emergency Physician            Rosamaria Gonzalez,

## 2024-06-08 NOTE — ED TRIAGE NOTES
Pt had COVID 4-5 weeks ago. She has had a cough and increased wheezing since. She was prescribed a 3 day course of steroids which she finished without relief. She has been using her albuterol inhaler without relief.

## 2024-06-08 NOTE — DISCHARGE INSTRUCTIONS
Consider restarting her allergy medication and consider sinus rinses to help with nasal congestion which may help with cough.  Acid reflux can also cause chronic cough and consider doing things like tipping the top your bed so that you are sleeping on more of an incline.  Follow-up with your PCP and pulmonology if symptoms persist.

## 2024-08-21 ENCOUNTER — HOSPITAL ENCOUNTER (EMERGENCY)
Facility: HOSPITAL | Age: 29
Discharge: HOME | End: 2024-08-21
Payer: MEDICAID

## 2024-08-21 VITALS
RESPIRATION RATE: 20 BRPM | WEIGHT: 170 LBS | TEMPERATURE: 98.1 F | BODY MASS INDEX: 25.76 KG/M2 | DIASTOLIC BLOOD PRESSURE: 84 MMHG | HEIGHT: 68 IN | SYSTOLIC BLOOD PRESSURE: 149 MMHG | OXYGEN SATURATION: 98 % | HEART RATE: 92 BPM

## 2024-08-21 DIAGNOSIS — J06.9 VIRAL URI WITH COUGH: Primary | ICD-10-CM

## 2024-08-21 LAB
DEPRECATED S PYO AG THROAT QL EIA: NEGATIVE
FLUAV AG NPH QL IA: NEGATIVE
FLUBV AG NOSE QL IA: NEGATIVE
SARS-COV-2 RDRP RESP QL NAA+PROBE: NOT DETECTED
SOURCE: NORMAL

## 2024-08-21 PROCEDURE — 87635 SARS-COV-2 COVID-19 AMP PRB: CPT

## 2024-08-21 PROCEDURE — 99283 EMERGENCY DEPT VISIT LOW MDM: CPT

## 2024-08-21 PROCEDURE — 87804 INFLUENZA ASSAY W/OPTIC: CPT

## 2024-08-21 PROCEDURE — 87070 CULTURE OTHR SPECIMN AEROBIC: CPT

## 2024-08-21 PROCEDURE — 87880 STREP A ASSAY W/OPTIC: CPT

## 2024-08-21 RX ORDER — IPRATROPIUM BROMIDE AND ALBUTEROL SULFATE 2.5; .5 MG/3ML; MG/3ML
1 SOLUTION RESPIRATORY (INHALATION)
Status: DISCONTINUED | OUTPATIENT
Start: 2024-08-21 | End: 2024-08-21

## 2024-08-21 ASSESSMENT — PAIN - FUNCTIONAL ASSESSMENT: PAIN_FUNCTIONAL_ASSESSMENT: 0-10

## 2024-08-21 ASSESSMENT — PAIN SCALES - GENERAL: PAINLEVEL_OUTOF10: 0

## 2024-08-21 ASSESSMENT — ENCOUNTER SYMPTOMS
SHORTNESS OF BREATH: 0
BACK PAIN: 0

## 2024-08-21 NOTE — ED TRIAGE NOTES
Pt c/o cough and congestion for a few days. Wants to make sure she doesn't need a breathing tx. Thinks she may have Strept or COVID

## 2024-08-21 NOTE — DISCHARGE INSTRUCTIONS
Take otc medications for symptoms.   Adequate hydration.   Return to ED for new or worsening symptoms.

## 2024-08-22 NOTE — ED PROVIDER NOTES
[facility] EMERGENCY DEPT  EMERGENCY DEPARTMENT ENCOUNTER      Pt Name: Lucía Khan  MRN: [id number]  Birthdate 1995  Date of evaluation: 8/21/2024  Provider: MIROSLAVA Chavarria  11:42 PM    CHIEF COMPLAINT       Chief Complaint   Patient presents with    Cough    Nasal Congestion         HISTORY OF PRESENT ILLNESS    Lucía Khan is a 29 y.o. female who presents to the emergency department with cough and wheezing x 1 week.     29 y.o. f who presents to the emergency department for cough and wheezing x 1 week..  Cough has been nonproductive.  Shortness of breath comes and goes.  She does have a history of asthma and it feels similar to past episodes.  She has been using her inhaler with minimal improvement.  Denies any new symptoms new fevers or worsening of her symptoms.  States that she was treated with a steroid course couple weeks ago but did not seem to help.  She does have a runny nose has been going on for several weeks.  She is been out of her Singulair medication.  She does have chronic acid reflux which she is treated with medications for.  Denies any swelling of lower extremities, history of blood clots or hemoptysis.    The history is provided by the patient.       Nursing Notes were reviewed.    REVIEW OF SYSTEMS       Review of Systems   Constitutional:  Negative for chills, fatigue and fever.   HENT:  Positive for rhinorrhea. Negative for congestion, drooling, ear pain, sore throat and trouble swallowing.    Eyes:  Negative for visual disturbance.   Respiratory:  Positive for cough and wheezing. Negative for chest tightness and shortness of breath.    Cardiovascular:  Negative for chest pain, palpitations and leg swelling.   Gastrointestinal:  Negative for abdominal pain, nausea and vomiting.   Endocrine: Negative for polydipsia, polyphagia and polyuria.   Genitourinary:  Negative for dysuria, flank pain, pelvic pain and urgency.   Musculoskeletal:  Negative for back pain, myalgias, neck

## 2024-08-23 LAB
BACTERIA SPEC CULT: NORMAL
SERVICE CMNT-IMP: NORMAL

## 2024-08-23 ASSESSMENT — ENCOUNTER SYMPTOMS
COUGH: 1
VOMITING: 0
RHINORRHEA: 1
SORE THROAT: 0
WHEEZING: 1
NAUSEA: 0
ABDOMINAL PAIN: 0
TROUBLE SWALLOWING: 0
CHEST TIGHTNESS: 0

## 2024-11-20 ENCOUNTER — OFFICE VISIT (OUTPATIENT)
Age: 29
End: 2024-11-20
Payer: MEDICAID

## 2024-11-20 VITALS — BODY MASS INDEX: 28.34 KG/M2 | HEIGHT: 68 IN | WEIGHT: 187 LBS

## 2024-11-20 DIAGNOSIS — M25.562 CHRONIC PAIN OF LEFT KNEE: ICD-10-CM

## 2024-11-20 DIAGNOSIS — M25.552 BILATERAL HIP PAIN: ICD-10-CM

## 2024-11-20 DIAGNOSIS — G89.29 CHRONIC PAIN OF LEFT KNEE: Primary | ICD-10-CM

## 2024-11-20 DIAGNOSIS — G89.29 CHRONIC PAIN OF LEFT KNEE: ICD-10-CM

## 2024-11-20 DIAGNOSIS — M25.562 CHRONIC PAIN OF LEFT KNEE: Primary | ICD-10-CM

## 2024-11-20 DIAGNOSIS — M25.551 BILATERAL HIP PAIN: ICD-10-CM

## 2024-11-20 PROCEDURE — 99204 OFFICE O/P NEW MOD 45 MIN: CPT | Performed by: PHYSICIAN ASSISTANT

## 2024-11-20 PROCEDURE — 73564 X-RAY EXAM KNEE 4 OR MORE: CPT | Performed by: PHYSICIAN ASSISTANT

## 2024-11-20 RX ORDER — MELOXICAM 15 MG/1
15 TABLET ORAL DAILY
Qty: 30 TABLET | Refills: 3 | Status: SHIPPED | OUTPATIENT
Start: 2024-11-20

## 2024-11-20 SDOH — HEALTH STABILITY: PHYSICAL HEALTH: ON AVERAGE, HOW MANY MINUTES DO YOU ENGAGE IN EXERCISE AT THIS LEVEL?: 120 MIN

## 2024-11-20 SDOH — HEALTH STABILITY: PHYSICAL HEALTH: ON AVERAGE, HOW MANY DAYS PER WEEK DO YOU ENGAGE IN MODERATE TO STRENUOUS EXERCISE (LIKE A BRISK WALK)?: 3 DAYS

## 2024-11-20 NOTE — PROGRESS NOTES
with mild arthritic changes.  An AP, tunnel, lateral, skyline of the left knee is also obtained showing mild medial wear arthritis without acute abnormalities.    Assessment: Left knee pain rule out meniscal tear, polyarthralgia    Plan: At this point, we discussed treatment options.  We will start her on Mobic 15 mg once a day with food.  She is instructed on use and precautions.  We will move forth an MRI of the left knee for further evaluation of meniscal pathology.  We will move forward with a series of rheumatologic labs for further evaluation of autoimmune disorder.    Care plan outlined and precautions discussed. Radiographs and Results were reviewed with the patient.  Medications were reviewed with the patient. All of pt's questions and concerns were addressed.  Increasing symptoms and return precautions associated with chief complaint and evaluation were reviewed with the patient in detail.  The patient demonstrated adequate understanding.  Social determinents of health were discussed and did not alter treatment plan.            JR Higinio DOUGLAS, BESSIE, ATC      Note:  This note was generated with voice recognition software.  Any typographical/grammatical errors are unintentional.

## 2024-11-27 ENCOUNTER — HOSPITAL ENCOUNTER (OUTPATIENT)
Facility: HOSPITAL | Age: 29
Setting detail: SPECIMEN
Discharge: HOME | End: 2024-11-30

## 2024-11-27 LAB — LABCORP SPECIMEN COLLECTION: NORMAL

## 2024-11-27 PROCEDURE — 99001 SPECIMEN HANDLING PT-LAB: CPT

## 2024-11-28 LAB
BASOPHILS # BLD AUTO: 0 X10E3/UL (ref 0–0.2)
BASOPHILS NFR BLD AUTO: 0 %
CCP IGA+IGG SERPL IA-ACNC: 5 UNITS (ref 0–19)
CENTROMERE B AB SER-ACNC: <0.2 AI (ref 0–0.9)
CHROMATIN AB SERPL-ACNC: <0.2 AI (ref 0–0.9)
CK SERPL-CCNC: 120 U/L (ref 32–182)
CRP SERPL-MCNC: 3 MG/L (ref 0–10)
DSDNA AB SER-ACNC: 3 IU/ML (ref 0–9)
ENA JO1 AB SER-ACNC: <0.2 AI (ref 0–0.9)
ENA RNP AB SER-ACNC: 0.2 AI (ref 0–0.9)
ENA SCL70 AB SER-ACNC: <0.2 AI (ref 0–0.9)
ENA SM AB SER-ACNC: <0.2 AI (ref 0–0.9)
ENA SS-A AB SER-ACNC: <0.2 AI (ref 0–0.9)
ENA SS-B AB SER-ACNC: <0.2 AI (ref 0–0.9)
EOSINOPHIL # BLD AUTO: 0.1 X10E3/UL (ref 0–0.4)
EOSINOPHIL NFR BLD AUTO: 1 %
ERYTHROCYTE [DISTWIDTH] IN BLOOD BY AUTOMATED COUNT: 11.8 % (ref 11.7–15.4)
ERYTHROCYTE [SEDIMENTATION RATE] IN BLOOD BY WESTERGREN METHOD: 13 MM/HR (ref 0–32)
HCT VFR BLD AUTO: 44.4 % (ref 34–46.6)
HGB BLD-MCNC: 14.5 G/DL (ref 11.1–15.9)
IMM GRANULOCYTES # BLD AUTO: 0 X10E3/UL (ref 0–0.1)
IMM GRANULOCYTES NFR BLD AUTO: 0 %
LYMPHOCYTES # BLD AUTO: 3.8 X10E3/UL (ref 0.7–3.1)
LYMPHOCYTES NFR BLD AUTO: 42 %
Lab: NORMAL
MCH RBC QN AUTO: 33.1 PG (ref 26.6–33)
MCHC RBC AUTO-ENTMCNC: 32.7 G/DL (ref 31.5–35.7)
MCV RBC AUTO: 101 FL (ref 79–97)
MONOCYTES # BLD AUTO: 0.4 X10E3/UL (ref 0.1–0.9)
MONOCYTES NFR BLD AUTO: 4 %
NEUTROPHILS # BLD AUTO: 4.8 X10E3/UL (ref 1.4–7)
NEUTROPHILS NFR BLD AUTO: 53 %
PLATELET # BLD AUTO: 349 X10E3/UL (ref 150–450)
RBC # BLD AUTO: 4.38 X10E6/UL (ref 3.77–5.28)
RHEUMATOID FACT SERPL-ACNC: <10 IU/ML
URATE SERPL-MCNC: 4.4 MG/DL (ref 2.6–6.2)
WBC # BLD AUTO: 9 X10E3/UL (ref 3.4–10.8)

## 2024-12-04 ENCOUNTER — OFFICE VISIT (OUTPATIENT)
Age: 29
End: 2024-12-04

## 2024-12-04 VITALS — RESPIRATION RATE: 16 BRPM | HEIGHT: 68 IN | BODY MASS INDEX: 28.43 KG/M2

## 2024-12-04 DIAGNOSIS — M79.672 BILATERAL FOOT PAIN: ICD-10-CM

## 2024-12-04 DIAGNOSIS — M25.571 BILATERAL ANKLE PAIN, UNSPECIFIED CHRONICITY: ICD-10-CM

## 2024-12-04 DIAGNOSIS — M25.572 BILATERAL ANKLE PAIN, UNSPECIFIED CHRONICITY: ICD-10-CM

## 2024-12-04 DIAGNOSIS — M25.373 CHRONIC INSTABILITY OF ANKLE: Primary | ICD-10-CM

## 2024-12-04 DIAGNOSIS — M72.2 PLANTAR FASCIITIS OF LEFT FOOT: ICD-10-CM

## 2024-12-04 DIAGNOSIS — M79.671 BILATERAL FOOT PAIN: ICD-10-CM

## 2024-12-04 NOTE — PATIENT INSTRUCTIONS
Please consider purchasing Spenco Total Max Arch Support. This can be bought on Amazon or on Spenco[employer]com. They range ~$40-$50 per pair.

## 2024-12-04 NOTE — PROGRESS NOTES
Patient presents today complaining of bilat foot and ankle pain. Patient presents to wearing regular tennis shoes with bilat foot inversion

## 2024-12-05 ENCOUNTER — HOSPITAL ENCOUNTER (OUTPATIENT)
Facility: HOSPITAL | Age: 29
Discharge: HOME | End: 2024-12-08
Payer: MEDICAID

## 2024-12-05 DIAGNOSIS — M25.552 BILATERAL HIP PAIN: ICD-10-CM

## 2024-12-05 DIAGNOSIS — M25.551 BILATERAL HIP PAIN: ICD-10-CM

## 2024-12-05 DIAGNOSIS — G89.29 CHRONIC PAIN OF LEFT KNEE: ICD-10-CM

## 2024-12-05 DIAGNOSIS — M25.562 CHRONIC PAIN OF LEFT KNEE: ICD-10-CM

## 2024-12-05 PROCEDURE — 73721 MRI JNT OF LWR EXTRE W/O DYE: CPT

## 2025-02-03 ENCOUNTER — OFFICE VISIT (OUTPATIENT)
Age: 30
End: 2025-02-03
Payer: MEDICAID

## 2025-02-03 VITALS — HEIGHT: 68 IN | BODY MASS INDEX: 28.43 KG/M2

## 2025-02-03 DIAGNOSIS — M25.373 INSTABILITY OF ANKLE, UNSPECIFIED LATERALITY: Primary | ICD-10-CM

## 2025-02-03 PROCEDURE — 99213 OFFICE O/P EST LOW 20 MIN: CPT | Performed by: ORTHOPAEDIC SURGERY

## 2025-02-03 NOTE — PROGRESS NOTES
AMBULATORY PROGRESS NOTE      Patient: Lucía Khan             MRN: [id number]     SSN: [id number] Body mass index is 28.43 kg/m².  YOB: 1995     AGE: 30 y.o.       EX: female    PCP: Marielena Pedroza DO       IMPRESSION //  DIAGNOSIS AND TREATMENT PLAN      Lucía Khan has a diagnosis of:      DIAGNOSES    1. Instability of ankle, unspecified laterality          PLAN:    1. Recommended HEP for bilateral achilles tendon pain  2. Prescribed Mobic 15 mg QD  3. DME: bilateral trilaminar orthotics  4. Consider Power Step inserts as an alternative to custom orthotics     RTO 8 weeks     Orders Placed This Encounter    DME Order for (Specify) as OP     - CUSTOM DME device ordered -  Bilateral MEDIAL POSTED TRILAMINAR ORTHOTIC  - Diagnosis: plantar fasciitis and ankle instability  - Length of Need: Lifetime    This item, is of medical necessity, in order to support the medial column medial hindfoot in this individual, who has progressive collapsing foot deformity/posterior tibial tendinitis/posterior tibial dysfunction.  This will raise her arch, improve her mechanics, and balance her posterior tibial tendon and peroneal brevis tendons as these are antagonistic tendons.    GURDEEP Ferro MD  2/3/2025 4:17 PM        Patient Instructions   Power step PULSE performance insoles/ running shoe pain relief orthotic      Please follow up with your PCP for any health maintenance as recommended.         Lucía Khan  expresses understanding of the diagnosis, treatment plan, and all of their proposed questions were answered to their satisfaction. Patient education has been provided re the diagnoses.         HPI //  OBJECTIVE EXAMINATION      Lucía Khan IS A 30 y.o. female who is a/an  established patient, presenting to my outpatient office for evaluation of  the following chief complaint(s):     Chief Complaint   Patient presents with    Follow-up     Bilat foot pain

## 2025-02-25 ENCOUNTER — OFFICE VISIT (OUTPATIENT)
Age: 30
End: 2025-02-25
Payer: MEDICAID

## 2025-02-25 DIAGNOSIS — M25.373 INSTABILITY OF ANKLE, UNSPECIFIED LATERALITY: Primary | ICD-10-CM

## 2025-02-25 DIAGNOSIS — M72.2 PLANTAR FASCIITIS OF LEFT FOOT: ICD-10-CM

## 2025-02-25 PROCEDURE — 99213 OFFICE O/P EST LOW 20 MIN: CPT | Performed by: ORTHOPAEDIC SURGERY

## 2025-02-25 NOTE — PROGRESS NOTES
AMBULATORY PROGRESS NOTE      Patient: Lucía Khan             MRN: [id number]     SSN: [id number] There is no height or weight on file to calculate BMI.  YOB: 1995     AGE: 30 y.o.       EX: female    PCP: Marielena Pedroza DO       IMPRESSION //  DIAGNOSIS AND TREATMENT PLAN      Lucía Khan has a diagnosis of:      DIAGNOSES    1. Instability of ankle, unspecified laterality    2. Plantar fasciitis of left foot          PLAN:    1. Patient will visit [facility] for bilateral medially posted trilaminar orthotics   2. PT referral for ankle instability bilateral   3. May consider MRI of both ankles if symptoms do not improve     RTO 6 weeks    No orders of the defined types were placed in this encounter.       Patient Instructions   [facility]  [address] [place] [address]  Phone: [phone]        Please follow up with your PCP for any health maintenance as recommended.         Lucía Khan  expresses understanding of the diagnosis, treatment plan, and all of their proposed questions were answered to their satisfaction. Patient education has been provided re the diagnoses.         HPI //  OBJECTIVE EXAMINATION      Lucía Khan IS A 30 y.o. female who is a/an  established patient, presenting to my outpatient office for evaluation of  the following chief complaint(s):     Chief Complaint   Patient presents with    Follow-up     Bilat achilles       Patient presents for a bilateral ankle follow up. She has worsening soreness and instability to both ankles recently, right is worse than left. Her ankles constantly need to pop. She has not been contacted regarding formal PT. She has noticed more pronation of the feet especially when walking. The pronation is affecting her hitting mechanics during softball games.     Of note: she recalls being very flexible and \"double jointed\" since childhood. Denies formal diagnosis of

## 2025-03-03 ENCOUNTER — OFFICE VISIT (OUTPATIENT)
Age: 30
End: 2025-03-03
Payer: MEDICAID

## 2025-03-03 VITALS — HEIGHT: 68 IN | WEIGHT: 192 LBS | BODY MASS INDEX: 29.1 KG/M2

## 2025-03-03 DIAGNOSIS — G56.03 BILATERAL CARPAL TUNNEL SYNDROME: Primary | ICD-10-CM

## 2025-03-03 DIAGNOSIS — Q74.0 CONGENITAL POSITIVE ULNAR VARIANCE OF LEFT WRIST: ICD-10-CM

## 2025-03-03 DIAGNOSIS — M65.4 DE QUERVAIN'S TENOSYNOVITIS, BILATERAL: ICD-10-CM

## 2025-03-03 DIAGNOSIS — Q74.0 CONGENITAL POSITIVE ULNAR VARIANCE OF RIGHT WRIST: ICD-10-CM

## 2025-03-03 PROCEDURE — 99213 OFFICE O/P EST LOW 20 MIN: CPT

## 2025-03-03 PROCEDURE — 73110 X-RAY EXAM OF WRIST: CPT

## 2025-03-03 RX ORDER — DEXTROAMPHETAMINE SACCHARATE, AMPHETAMINE ASPARTATE, DEXTROAMPHETAMINE SULFATE AND AMPHETAMINE SULFATE 5; 5; 5; 5 MG/1; MG/1; MG/1; MG/1
20 TABLET ORAL 2 TIMES DAILY
COMMUNITY
Start: 2025-02-17

## 2025-03-03 RX ORDER — OLANZAPINE 5 MG/1
1 TABLET ORAL NIGHTLY
COMMUNITY

## 2025-03-03 RX ORDER — BUPROPION HYDROCHLORIDE 150 MG/1
300 TABLET ORAL EVERY MORNING
COMMUNITY
Start: 2025-02-17

## 2025-03-03 RX ORDER — NORETHINDRONE ACETATE AND ETHINYL ESTRADIOL AND FERROUS FUMARATE 1MG-20(21)
1 KIT ORAL DAILY
COMMUNITY
Start: 2025-02-16

## 2025-03-03 RX ORDER — ZIPRASIDONE HYDROCHLORIDE 40 MG/1
40 CAPSULE ORAL 2 TIMES DAILY WITH MEALS
COMMUNITY
Start: 2025-01-29

## 2025-03-03 NOTE — PROGRESS NOTES
Negative               <20                            Weak positive      20 - 39                            Moderate positive  40 - 59                            Strong positive        >59          Impression:         ICD-10-CM    1. Bilateral carpal tunnel syndrome  G56.03 DME Order for (Specify) as OP     NCV WITH EMG BILATERAL UPPER EXTREMITIES      2. De Quervain's tenosynovitis, bilateral  M65.4       3. 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 Congenital positive ulnar variance of right wrist  Q74.0 AMB POC XRAY, WRIST; COMPLETE, 3+ VIEW     AMB POC XRAY, WRIST; COMPLETE, 3+ VIEW             Plan:     Bilateral upper extremity EMGs ordered to evaluate for carpal tunnel syndrome versus other nerve pathology    Bilateral carpal tunnel braces applied today close patient wearing nighttime    She may have an early de Quervain's tenosynovitis however it is unclear if she is somewhat tender to palpation globally about the wrist.  May consider steroid injection at next office visit if persistent.    Ulnar positivity noted on x-rays today which could be that the etiology of her ulnar-sided wrist pain.  May consider steroid injection in the future.    Inflammatory labs reviewed with patient-benign, reassurance provided.      Plan was reviewed with patient, who verbalized agreement and understanding of the plan.     Return for EMG follow up.     Kamila Bender PA-C  3/3/2025 4:15 PM    Note: This note was completed using voice recognition software.  Any typographical/name errors or mistakes are unintentional.

## 2025-03-06 ENCOUNTER — OFFICE VISIT (OUTPATIENT)
Age: 30
End: 2025-03-06
Payer: MEDICAID

## 2025-03-06 VITALS — HEIGHT: 68 IN | BODY MASS INDEX: 29.19 KG/M2

## 2025-03-06 DIAGNOSIS — G89.29 CHRONIC PAIN OF LEFT KNEE: Primary | ICD-10-CM

## 2025-03-06 DIAGNOSIS — M25.562 CHRONIC PAIN OF LEFT KNEE: Primary | ICD-10-CM

## 2025-03-06 PROCEDURE — 99214 OFFICE O/P EST MOD 30 MIN: CPT | Performed by: PHYSICIAN ASSISTANT

## 2025-03-06 NOTE — PROGRESS NOTES
Ligamentously stable.    Review of labs: WBC-9.0, CRP-3, ESR-13, uric acid-4.4, rheumatoid factor less than 10, total CK-120, CCP antibodies-5, Sjogren-negative    Review of MRI left knee:  IMPRESSION:     1.  Structurally normal knee.  2.  Nonspecific patchy bone marrow edema within the metadiaphyseal femur and  tibia, similar but improved from prior. Likely chronic in nature.       Assessment: Left knee patellofemoral syndrome    Plan: At this point, we discussed treatment options.  Given the abnormality seen on the MRI we will move forth a three-phase bone scan to further evaluate her knee pain.  Will see her back after the bone scan for further evaluation.  She is instructed on VMO and quad strengthening.    Care plan outlined and precautions discussed. Radiographs and Results were reviewed with the patient.  Medications were reviewed with the patient. All of pt's questions and concerns were addressed.  Increasing symptoms and return precautions associated with chief complaint and evaluation were reviewed with the patient in detail.  The patient demonstrated adequate understanding.  Social determinents of health were discussed and did not alter treatment plan.            JR Higinio DOUGLAS, PAJOSS, ATC      Note:  This note was generated with voice recognition software.  Any typographical/grammatical errors are unintentional.

## 2025-03-24 SDOH — HEALTH STABILITY: PHYSICAL HEALTH: ON AVERAGE, HOW MANY DAYS PER WEEK DO YOU ENGAGE IN MODERATE TO STRENUOUS EXERCISE (LIKE A BRISK WALK)?: 1 DAY

## 2025-03-24 SDOH — HEALTH STABILITY: PHYSICAL HEALTH: ON AVERAGE, HOW MANY MINUTES DO YOU ENGAGE IN EXERCISE AT THIS LEVEL?: 60 MIN

## 2025-03-25 ASSESSMENT — ENCOUNTER SYMPTOMS
NAUSEA: 0
SORE THROAT: 0
VOMITING: 0
SHORTNESS OF BREATH: 0
ABDOMINAL DISTENTION: 0
DIARRHEA: 0
RHINORRHEA: 0
ABDOMINAL PAIN: 0
CHEST TIGHTNESS: 0

## 2025-03-26 ENCOUNTER — OFFICE VISIT (OUTPATIENT)
Facility: CLINIC | Age: 30
End: 2025-03-26

## 2025-03-26 VITALS
WEIGHT: 191 LBS | TEMPERATURE: 97.9 F | HEIGHT: 68 IN | OXYGEN SATURATION: 97 % | HEART RATE: 85 BPM | RESPIRATION RATE: 18 BRPM | DIASTOLIC BLOOD PRESSURE: 88 MMHG | BODY MASS INDEX: 28.95 KG/M2 | SYSTOLIC BLOOD PRESSURE: 126 MMHG

## 2025-03-26 DIAGNOSIS — F41.9 ANXIETY AND DEPRESSION: ICD-10-CM

## 2025-03-26 DIAGNOSIS — Z76.89 ENCOUNTER TO ESTABLISH CARE WITH NEW DOCTOR: ICD-10-CM

## 2025-03-26 DIAGNOSIS — L02.91 ABSCESS: Primary | ICD-10-CM

## 2025-03-26 DIAGNOSIS — J30.2 SEASONAL ALLERGIC RHINITIS, UNSPECIFIED TRIGGER: ICD-10-CM

## 2025-03-26 DIAGNOSIS — R30.0 DYSURIA: ICD-10-CM

## 2025-03-26 DIAGNOSIS — Z11.59 NEED FOR HEPATITIS C SCREENING TEST: ICD-10-CM

## 2025-03-26 DIAGNOSIS — F31.9 BIPOLAR AFFECTIVE DISORDER, REMISSION STATUS UNSPECIFIED (HCC): ICD-10-CM

## 2025-03-26 DIAGNOSIS — F32.A ANXIETY AND DEPRESSION: ICD-10-CM

## 2025-03-26 DIAGNOSIS — F90.9 ATTENTION DEFICIT HYPERACTIVITY DISORDER (ADHD), UNSPECIFIED ADHD TYPE: ICD-10-CM

## 2025-03-26 DIAGNOSIS — N91.2 AMENORRHEA: ICD-10-CM

## 2025-03-26 RX ORDER — FLUTICASONE PROPIONATE 50 MCG
2 SPRAY, SUSPENSION (ML) NASAL DAILY
Qty: 16 G | Refills: 1 | Status: SHIPPED | OUTPATIENT
Start: 2025-03-26

## 2025-03-26 RX ORDER — ATOMOXETINE 40 MG/1
CAPSULE ORAL
COMMUNITY
Start: 2025-03-25

## 2025-03-26 RX ORDER — ALBUTEROL SULFATE 90 UG/1
2 INHALANT RESPIRATORY (INHALATION) EVERY 4 HOURS PRN
Qty: 18 G | Refills: 2 | Status: SHIPPED | OUTPATIENT
Start: 2025-03-26

## 2025-03-26 RX ORDER — MONTELUKAST SODIUM 10 MG/1
10 TABLET ORAL NIGHTLY
Qty: 90 TABLET | Refills: 1 | Status: SHIPPED | OUTPATIENT
Start: 2025-03-26

## 2025-03-26 SDOH — ECONOMIC STABILITY: FOOD INSECURITY: WITHIN THE PAST 12 MONTHS, THE FOOD YOU BOUGHT JUST DIDN'T LAST AND YOU DIDN'T HAVE MONEY TO GET MORE.: NEVER TRUE

## 2025-03-26 SDOH — ECONOMIC STABILITY: FOOD INSECURITY: WITHIN THE PAST 12 MONTHS, YOU WORRIED THAT YOUR FOOD WOULD RUN OUT BEFORE YOU GOT MONEY TO BUY MORE.: NEVER TRUE

## 2025-03-26 ASSESSMENT — PATIENT HEALTH QUESTIONNAIRE - PHQ9
SUM OF ALL RESPONSES TO PHQ QUESTIONS 1-9: 2
1. LITTLE INTEREST OR PLEASURE IN DOING THINGS: SEVERAL DAYS
SUM OF ALL RESPONSES TO PHQ QUESTIONS 1-9: 2
2. FEELING DOWN, DEPRESSED OR HOPELESS: SEVERAL DAYS
SUM OF ALL RESPONSES TO PHQ QUESTIONS 1-9: 2
SUM OF ALL RESPONSES TO PHQ QUESTIONS 1-9: 2

## 2025-03-26 ASSESSMENT — ENCOUNTER SYMPTOMS: ROS SKIN COMMENTS: ABSCESS

## 2025-03-26 ASSESSMENT — ANXIETY QUESTIONNAIRES
GAD7 TOTAL SCORE: 21
2. NOT BEING ABLE TO STOP OR CONTROL WORRYING: NEARLY EVERY DAY
7. FEELING AFRAID AS IF SOMETHING AWFUL MIGHT HAPPEN: NEARLY EVERY DAY
6. BECOMING EASILY ANNOYED OR IRRITABLE: NEARLY EVERY DAY
IF YOU CHECKED OFF ANY PROBLEMS ON THIS QUESTIONNAIRE, HOW DIFFICULT HAVE THESE PROBLEMS MADE IT FOR YOU TO DO YOUR WORK, TAKE CARE OF THINGS AT HOME, OR GET ALONG WITH OTHER PEOPLE: SOMEWHAT DIFFICULT
3. WORRYING TOO MUCH ABOUT DIFFERENT THINGS: NEARLY EVERY DAY
4. TROUBLE RELAXING: NEARLY EVERY DAY
1. FEELING NERVOUS, ANXIOUS, OR ON EDGE: NEARLY EVERY DAY
5. BEING SO RESTLESS THAT IT IS HARD TO SIT STILL: NEARLY EVERY DAY

## 2025-03-26 NOTE — PROGRESS NOTES
Lucía Khan is a 30 y.o. female presenting today for New Patient (Pt is here to establish care. Pt has a bump on her stomach for two weeks and also has boils under her arm. )  .     Chief Complaint   Patient presents with    New Patient     Pt is here to establish care. Pt has a bump on her stomach for two weeks and also has boils under her arm.        HPI:  Lucía Khan presents to the office today for establishment of care    Patient has a PMH of left knee patellofemoral syndrome, left foot plantar fasciitis, endometriosis, ADHD, anxiety and depression, panic attacks, bipolar disorder, GERD, seasonal allergies    left knee patellofemoral syndrome, left foot plantar fasciitis: Patient follows with orthopedics and physical therapy and they currently have a plan of treatment.    Endometriosis: Had abnormal menstrual bleeding. Follows with OBGYN, patient had D&C in July 2024 and has not had menstrual cycle since then.  She is on levonorgestrel-estradiol.     Anxiety and depression, panic attacks, bipolar disorder: Patient is on Geodon, Zyprexa, Wellbutrin, Xanax as needed.  Follows closely with psychiatry    ADHD: Patient is on Adderral.  Was previously on Vyvanse but reported it did not last long enough in her system to work.    GERD: Patient is on lansoprazole    Asthma, Seasonal allergy: Patient is on Flonase, Singulair. Has appointment with allergy.  Denies any major complications with asthma    Today patient reports she has been doing well.  Her only concern is little knots in her armpits. She typically shaves with a razor. She also reports an abscess on suprapubic abdomen. Initially had clear fluid now with yellowish green fluid. Thought it was an ingrown hair and tried removing it but there was no hair. Ongoing for 2 weeks. She reports dysuria and sensation of not emptying her bladder completely, urinary frequency.      Review of Systems   Constitutional:  Negative for chills, diaphoresis, 
\"Have you been to the ER, urgent care clinic since your last visit?  Hospitalized since your last visit?\"    YES - When: approximately 4  weeks ago.  Where and Why: Pt went to the Urgent care for a sore throat and ear ache.    “Have you seen or consulted any other health care providers outside our system since your last visit?”    NO     “Have you had a pap smear?”    NO    No cervical cancer screening on file             
2019

## 2025-03-26 NOTE — PATIENT INSTRUCTIONS
[facility] Transportation Resources*  (Call [facility]/211 if need more resources.)    [facility] Services [facility]  What they offer: Senior Services [facility] [employer] offers fixed routes, medical transportation, and on-demand response transit for those age 60+.  Accepts donations for regular service.  Fare: Approximately $4.00 each way  Phone Number: 212.831.6927  Website: https://www.[employer]ch    [facility] [employer]  What they offer: In compliance with the American Disabilities Act, [facility] Transit provides a shared ride, advanced reservation, origin to destination service for disabled individuals who are unable to use regular fixed route public transportation services because of their disabilities.   Phone Number: 999.120.1763  Apply online: https://www.[employer]/TransitAgencyChoice.aspx     Medicare Advantage Plans  Some plans may provide transportation. Members should contact the Member Services or Transportation number on the back of their insurance card for more information or to schedule transportation.    Medicaid Plans - [facility] Plus     Each health plan has options for transportation services. Contact your insurance provider to schedule transportation. Transportation or Member Services contact information is listed on the back of the insurance card.         Insurance Contacts     o [employer] [facility]   Phone: [phone]   Website:  https://www.[employer]/[place]    o [employer] [facility] Plus   Phone: 1-335.727.1009  Website: https://www.Qu[employer] Book/vamedicaid    o [employer] Complete Care   Phone: (291) 716-2477  Website: https://www.[employer]stars.[employer]/members    o [facility] Health Plans   Phone: 1-446.249.4688 or 1-100.462.9297   Website: https://www.[employer]/communitycare    o [employer] Community Plan   Phone: 1-880.953.5627  Website: https://www.[facility].[employer]/[place]

## 2025-04-03 ENCOUNTER — TELEPHONE (OUTPATIENT)
Facility: CLINIC | Age: 30
End: 2025-04-03

## 2025-04-03 RX ORDER — LANSOPRAZOLE 30 MG/1
30 CAPSULE, DELAYED RELEASE ORAL DAILY
Qty: 90 CAPSULE | Refills: 0 | Status: SHIPPED | OUTPATIENT
Start: 2025-04-03

## 2025-04-03 NOTE — TELEPHONE ENCOUNTER
Pt requesting a order for:    Lansoprazole dr 30 MG capsule    Pt stated she forgot to mention this medication during her visit  Pt stated she has a specialist who prescribes the medication but the appointment would be too far off and she is in need of the medication now please    [facility]  [address] [facility]     [phone]

## 2025-04-08 ENCOUNTER — APPOINTMENT (OUTPATIENT)
Facility: HOSPITAL | Age: 30
End: 2025-04-08
Payer: MEDICAID

## 2025-04-21 ENCOUNTER — HOSPITAL ENCOUNTER (OUTPATIENT)
Facility: HOSPITAL | Age: 30
Setting detail: RECURRING SERIES
Discharge: HOME | End: 2025-04-24
Payer: MEDICAID

## 2025-04-21 PROCEDURE — 97162 PT EVAL MOD COMPLEX 30 MIN: CPT

## 2025-04-21 NOTE — THERAPY EVALUATION
pain, more mobility   Patient Self Reported Health Status: good  Rehabilitation Potential: good      Short term goals: To be accomplished within 2-3 weeks  1. Pt will be independent with HEP to maintain progression.  Status at evaluation: initiated HEP     Long term goals: To be accomplished within 6-8 weeks  1. Pt will improve LEFS score by 9 points to show improvement with functional mobility performance.  Status at evaluation: 51     2. Pt will report at least 60% improvement of pain to improve ease with ADLs & job duties.  Status at evaluation: 2-9/10, constant pain     3. Pt will improve B ankle PF strength to at least 4/5 to improve ease with ADLs & job duties/prolonged standing.  Status at evaluation: 3-/5, mod pain     4. Pt will be able to perform plank & side plank for at least 45 seconds indicating improving core strength/endurance for prolonged standing & job duties.  Status at evaluation: fair form for 10 sec with side plank, more challenged with Left; 15 sec with standard plank     5. Pt will improve ankle DF AROM in long sitting at least 5 degrees indicating improving flexibility of B calves to improve tolerance with standing.  Status at evaluation: 0 degree with Left, -2 degrees with Right       Frequency / Duration: Patient to be seen 2 times per week for 8 weeks    Patient/ Caregiver education and instruction: Diagnosis, prognosis, self care, activity modification, brace/ splint application, and exercises [x]  Plan of care has been reviewed with MACARIO Martinez, PT       4/21/2025       8:29 AM  ===================================================================  I certify that the above Therapy Services are being furnished while the patient is under my care. I agree with the treatment plan and certify that this therapy is necessary.    Physician's Signature:_________________________   DATE:_________   TIME:________                           Raymond Ferro MD    ** Signature, Date and

## 2025-04-21 NOTE — PROGRESS NOTES
movement patterns, analyze and modify for postural abnormalities, analyze and address imbalance/dizziness, and instruct in home and community integration to address functional deficits and attain remaining goals.    Progress toward goals / Updated goals:  []  See Progress Note/Recertification    See POC    PLAN  yes Continue plan of care  []  Upgrade activities as tolerated  []  Discharge due to :  []  Other:     If an interpreting service was utilized for treatment of this patient, the contents of this document represent the material reviewed with the patient via the .    Fartun Martinez PT    4/21/2025    8:29 AM    Future Appointments   Date Time Provider Department Center   4/21/2025  3:20 PM Fartun Martinez PT [id number]   4/24/2025  2:45 PM Nishant Funez MD [facility]   4/29/2025  3:40 PM Kamila Bender, PAJOSS WAN BS [facility]   9/26/2025 11:15 AM Laura Peralta MD [facility] DE[facility]

## 2025-04-23 DIAGNOSIS — M25.562 CHRONIC PAIN OF LEFT KNEE: ICD-10-CM

## 2025-04-23 DIAGNOSIS — M25.551 BILATERAL HIP PAIN: ICD-10-CM

## 2025-04-23 DIAGNOSIS — G89.29 CHRONIC PAIN OF LEFT KNEE: ICD-10-CM

## 2025-04-23 DIAGNOSIS — M25.552 BILATERAL HIP PAIN: ICD-10-CM

## 2025-04-23 RX ORDER — MELOXICAM 15 MG/1
15 TABLET ORAL DAILY
Qty: 30 TABLET | Refills: 3 | Status: SHIPPED | OUTPATIENT
Start: 2025-04-23

## 2025-04-29 ENCOUNTER — CLINICAL DOCUMENTATION (OUTPATIENT)
Age: 30
End: 2025-04-29

## 2025-04-29 NOTE — PROGRESS NOTES
Called patient regarding EMG follow up appt scheduled for this afternoon with Kamila.  Patient was a No Show to EMG last week with Dr. Funez.  Advised patient that Dr. Funez's policy is that we may not reschedule with him if patient no shows, and we will send a referral to [facility] to have EMG done there.  Patient advised to call our office to schedule a follow up appt once she is scheduled for the EMG.  Patient expressed understanding.    EMG order and patient's insurance card faxed to [facility].

## 2025-04-30 ENCOUNTER — TELEPHONE (OUTPATIENT)
Facility: HOSPITAL | Age: 30
End: 2025-04-30

## 2025-04-30 NOTE — TELEPHONE ENCOUNTER
SPOKE WITH PATIENT IN REGARDS TO ENCOUNTER W/ PSR YESTERDAY. I HEARD HER SIDE OF THE STORY AND APPOLOGIZED PROFUSELY FOR THE INCEDENT. PATIENT WAS WELL RECEPTIVE AND WAS WILLING TO TRANSFER TO THE [facility] LOCATION TO STAY WITH [facility].

## 2025-05-08 ENCOUNTER — APPOINTMENT (OUTPATIENT)
Facility: HOSPITAL | Age: 30
End: 2025-05-08
Payer: MEDICAID

## 2025-05-12 ENCOUNTER — HOSPITAL ENCOUNTER (OUTPATIENT)
Facility: HOSPITAL | Age: 30
Setting detail: RECURRING SERIES
Discharge: HOME | End: 2025-05-15
Payer: MEDICAID

## 2025-05-12 PROCEDURE — 97110 THERAPEUTIC EXERCISES: CPT

## 2025-05-12 PROCEDURE — 97530 THERAPEUTIC ACTIVITIES: CPT

## 2025-05-12 PROCEDURE — 97112 NEUROMUSCULAR REEDUCATION: CPT

## 2025-05-12 NOTE — PROGRESS NOTES
PHYSICAL / OCCUPATIONAL THERAPY - DAILY TREATMENT NOTE    Patient Name: Lucía Khan    Date: 2025    : 1995  Insurance: Payor: [facility] MEDICAID / Plan: DORA [facility] HEALTHKEEPERS PLUS / Product Type: *No Product type* /      Patient  verified Yes     Visit #   Current / Total 2 16   Time   In / Out 4:43 5:25   Pain   In / Out -6/10 7/10   Subjective Functional Status/Changes: The right calf is definitely a little worse than the left. I'm getting pain along the front as well as the back.      TREATMENT AREA =  Right foot pain  Left foot pain  Right ankle pain  Left ankle pain  Chronic instability of ankle  Plantar fasciitis of left foot    OBJECTIVE         Therapeutic Procedures:    Tx Min Billable or 1:1 Min (if diff from Tx Min) Procedure, Rationale, Specifics   15 15 [id number] Therapeutic Exercise (timed):  increase ROM, strength, coordination, balance, and proprioception to improve patient's ability to progress to PLOF and address remaining functional goals. (see flow sheet as applicable)     Details if applicable:       17  03223 Neuromuscular Re-Education (timed):  improve balance, coordination, kinesthetic sense, posture, core stability and proprioception to improve patient's ability to develop conscious control of individual muscles and awareness of position of extremities in order to progress to PLOF and address remaining functional goals. (see flow sheet as applicable)     Details if applicable:     10 10 [id number] Therapeutic Activity (timed):  use of dynamic activities replicating functional movements to increase ROM, strength, coordination, balance, and proprioception in order to improve patient's ability to progress to PLOF and address remaining functional goals.  (see flow sheet as applicable)     Details if applicable:               42 42 [facility] Totals Reminder: bill using total billable min of TIMED therapeutic procedures (example: do not include dry needle or estim unattended,

## 2025-05-13 ENCOUNTER — APPOINTMENT (OUTPATIENT)
Facility: HOSPITAL | Age: 30
End: 2025-05-13
Payer: MEDICAID

## 2025-05-14 ENCOUNTER — HOSPITAL ENCOUNTER (OUTPATIENT)
Facility: HOSPITAL | Age: 30
Setting detail: RECURRING SERIES
Discharge: HOME | End: 2025-05-17
Payer: MEDICAID

## 2025-05-14 PROCEDURE — 97112 NEUROMUSCULAR REEDUCATION: CPT

## 2025-05-14 PROCEDURE — 97530 THERAPEUTIC ACTIVITIES: CPT

## 2025-05-14 PROCEDURE — 97110 THERAPEUTIC EXERCISES: CPT

## 2025-05-14 NOTE — PROGRESS NOTES
PHYSICAL / OCCUPATIONAL THERAPY - DAILY TREATMENT NOTE    Patient Name: Lucía Khan    Date: 2025    : 1995  Insurance: Payor: [facility] MEDICAID / Plan: DORA [facility] HEALTHKEEPERS PLUS / Product Type: *No Product type* /      Patient  verified Yes     Visit #   Current / Total 3 16   Time   In / Out 5:23 6:07   Pain   In / Out 2 3   Subjective Functional Status/Changes:      TREATMENT AREA =  Right foot pain  Left foot pain  Right ankle pain  Left ankle pain  Chronic instability of ankle  Plantar fasciitis of left foot    OBJECTIVE      Therapeutic Procedures:    Tx Min Billable or 1:1 Min (if diff from Tx Min) Procedure, Rationale, Specifics   15  04408 Therapeutic Exercise (timed):  increase ROM, strength, coordination, balance, and proprioception to improve patient's ability to progress to PLOF and address remaining functional goals. (see flow sheet as applicable)     Details if applicable:       10  45858 Therapeutic Activity (timed):  use of dynamic activities replicating functional movements to increase ROM, strength, coordination, balance, and proprioception in order to improve patient's ability to progress to PLOF and address remaining functional goals.  (see flow sheet as applicable)     Details if applicable:     19  12770 Neuromuscular Re-Education (timed):  improve balance, coordination, kinesthetic sense, posture, core stability and proprioception to improve patient's ability to develop conscious control of individual muscles and awareness of position of extremities in order to progress to PLOF and address remaining functional goals. (see flow sheet as applicable)     Details if applicable:            Details if applicable:            Details if applicable:       [facility] Totals Reminder: bill using total billable min of TIMED therapeutic procedures (example: do not include dry needle or estim unattended, both untimed codes, in totals to left)  8-22 min = 1 unit; 23-37 min = 2 units;

## 2025-05-20 ENCOUNTER — APPOINTMENT (OUTPATIENT)
Facility: HOSPITAL | Age: 30
End: 2025-05-20
Payer: MEDICAID

## 2025-05-22 ENCOUNTER — APPOINTMENT (OUTPATIENT)
Facility: HOSPITAL | Age: 30
End: 2025-05-22
Payer: MEDICAID

## 2025-05-27 ENCOUNTER — APPOINTMENT (OUTPATIENT)
Facility: HOSPITAL | Age: 30
End: 2025-05-27
Payer: MEDICAID

## 2025-05-28 ENCOUNTER — HOSPITAL ENCOUNTER (OUTPATIENT)
Facility: HOSPITAL | Age: 30
Setting detail: RECURRING SERIES
Discharge: HOME | End: 2025-05-31
Payer: MEDICAID

## 2025-05-28 PROCEDURE — 97112 NEUROMUSCULAR REEDUCATION: CPT

## 2025-05-28 PROCEDURE — 97530 THERAPEUTIC ACTIVITIES: CPT

## 2025-05-28 PROCEDURE — 97110 THERAPEUTIC EXERCISES: CPT

## 2025-05-28 NOTE — PROGRESS NOTES
PHYSICAL / OCCUPATIONAL THERAPY - DAILY TREATMENT NOTE    Patient Name: Lucía Khan    Date: 2025    : 1995  Insurance: Payor: [facility] MEDICAID / Plan: DORA [facility] HEALTHKEEPERS PLUS / Product Type: *No Product type* /      Patient  verified Yes     Visit #   Current / Total 3 16   Time   In / Out 4:42 5:30   Pain   In / Out 6-7 4   Subjective Functional Status/Changes: Pt reported purchase of inexpensive inserts for both shoes. Has been doing guided ex's at home but focus mostly on stretching      TREATMENT AREA =  Right foot pain  Left foot pain  Right ankle pain  Left ankle pain  Chronic instability of ankle  Plantar fasciitis of left foot    OBJECTIVE      Therapeutic Procedures:    Tx Min Billable or 1:1 Min (if diff from Tx Min) Procedure, Rationale, Specifics   18  33026 Therapeutic Exercise (timed):  increase ROM, strength, coordination, balance, and proprioception to improve patient's ability to progress to PLOF and address remaining functional goals. (see flow sheet as applicable)     Details if applicable:       10  39680 Therapeutic Activity (timed):  use of dynamic activities replicating functional movements to increase ROM, strength, coordination, balance, and proprioception in order to improve patient's ability to progress to PLOF and address remaining functional goals.  (see flow sheet as applicable)     Details if applicable:     20  35132 Neuromuscular Re-Education (timed):  improve balance, coordination, kinesthetic sense, posture, core stability and proprioception to improve patient's ability to develop conscious control of individual muscles and awareness of position of extremities in order to progress to PLOF and address remaining functional goals. (see flow sheet as applicable)     Details if applicable:        29999 Manual Therapy (timed):  decrease pain to improve patient's ability to progress to PLOF and address remaining functional goals.  The manual therapy 
accomplished within 6-8 weeks  1. Pt will improve LEFS score by 9 points to show improvement with functional mobility performance.  Status at evaluation: 51  PN: 37/80 decline 5/28/25   2. Pt will report at least 60% improvement of pain to improve ease with ADLs & job duties.  Status at evaluation: 2-9/10, constant pain  PN: Pain stays b/w 5-6/10 and is constant.  5/28/25 Progressing    3. Pt will improve B ankle PF strength to at least 4/5 to improve ease with ADLs & job duties/prolonged standing.  Status at evaluation: 3-/5, mod pain  PN; 4-/5 min pain 5/28/25 progressing   4. Pt will be able to perform plank & side plank for at least 45 seconds indicating improving core strength/endurance for prolonged standing & job duties.  Status at evaluation: fair form for 10 sec with side plank, more challenged with Left; 15 sec with standard plank   30 second plank prone,  (5/14/2025)  PN No change 30 sec 5/28/25  5. Pt will improve ankle DF AROM in long sitting at least 5 degrees indicating improving flexibility of B calves to improve tolerance with standing.  Status at evaluation: 0 degree with Left, -2 degrees with Right  PN: seated DF 10 degrees L 7 degrees R 5/28/25 Progressing     Non-Medicare, NO NEW GOALS, can adjust or add frequency duration, no signature required      CONTINUE ALL PREVIOUS GOALS FROM ABOVE    Frequency / Duration:   Patient to be seen   1-2   times per week for   8    WEEKS    RECOMMENDATIONS  Patient would benefit from the continuation of skilled rehab interventions for functional progress to achieving above stated clinically significant goals.  Continue per initial Plan of Care.    If you have any questions/comments please contact us directly.  Thank you for allowing us to assist in the care of your patient.    Valerie Alfred, PT       5/28/2025       6:33 PM         Therapist Signature: Valerie Alfred PT Date: 5/28/2025 Time:  1:41 PM

## 2025-06-02 ENCOUNTER — HOSPITAL ENCOUNTER (OUTPATIENT)
Facility: HOSPITAL | Age: 30
Setting detail: RECURRING SERIES
End: 2025-06-02
Payer: MEDICAID

## 2025-06-03 ENCOUNTER — APPOINTMENT (OUTPATIENT)
Facility: HOSPITAL | Age: 30
End: 2025-06-03
Payer: MEDICAID

## 2025-06-10 ENCOUNTER — APPOINTMENT (OUTPATIENT)
Facility: HOSPITAL | Age: 30
End: 2025-06-10
Payer: MEDICAID

## 2025-06-10 ENCOUNTER — HOSPITAL ENCOUNTER (OUTPATIENT)
Facility: HOSPITAL | Age: 30
Setting detail: RECURRING SERIES
End: 2025-06-10
Payer: MEDICAID

## 2025-06-12 ENCOUNTER — HOSPITAL ENCOUNTER (OUTPATIENT)
Facility: HOSPITAL | Age: 30
Setting detail: RECURRING SERIES
Discharge: HOME | End: 2025-06-15
Payer: MEDICAID

## 2025-06-12 PROCEDURE — 97112 NEUROMUSCULAR REEDUCATION: CPT

## 2025-06-12 PROCEDURE — 97110 THERAPEUTIC EXERCISES: CPT

## 2025-06-12 PROCEDURE — 97530 THERAPEUTIC ACTIVITIES: CPT

## 2025-06-12 NOTE — PROGRESS NOTES
PHYSICAL / OCCUPATIONAL THERAPY - DAILY TREATMENT NOTE    Patient Name: Lucía Khan    Date: 2025    : 1995  Insurance: Payor: [facility] MEDICAID / Plan: DORA [facility] HEALTHKEEPERS PLUS / Product Type: *No Product type* /      Patient  verified Yes     Visit #   Current / Total 1 16   Time   In / Out 3:20 3:56   Pain   In / Out 3 - hip 0   Subjective Functional Status/Changes: Pt reports some increased pain in her hips today, reports that she is trying      TREATMENT AREA =  Right foot pain  Left foot pain  Right ankle pain  Left ankle pain  Chronic instability of ankle  Plantar fasciitis of left foot    OBJECTIVE         Therapeutic Procedures:    Tx Min Billable or 1:1 Min (if diff from Tx Min) Procedure, Rationale, Specifics   10  62840 Therapeutic Exercise (timed):  increase ROM, strength, coordination, balance, and proprioception to improve patient's ability to progress to PLOF and address remaining functional goals. (see flow sheet as applicable)     Details if applicable:       11  70417 Neuromuscular Re-Education (timed):  improve balance, coordination, kinesthetic sense, posture, core stability and proprioception to improve patient's ability to develop conscious control of individual muscles and awareness of position of extremities in order to progress to PLOF and address remaining functional goals. (see flow sheet as applicable)     Details if applicable:     15  32567 Therapeutic Activity (timed):  use of dynamic activities replicating functional movements to increase ROM, strength, coordination, balance, and proprioception in order to improve patient's ability to progress to PLOF and address remaining functional goals.  (see flow sheet as applicable)     Details if applicable:            Details if applicable:            Details if applicable:     36  [facility] Totals Reminder: bill using total billable min of TIMED therapeutic procedures (example: do not include dry needle or estim

## 2025-06-17 ENCOUNTER — HOSPITAL ENCOUNTER (OUTPATIENT)
Facility: HOSPITAL | Age: 30
Setting detail: RECURRING SERIES
End: 2025-06-17
Payer: MEDICAID

## 2025-06-17 ENCOUNTER — APPOINTMENT (OUTPATIENT)
Facility: HOSPITAL | Age: 30
End: 2025-06-17
Payer: MEDICAID

## 2025-06-18 ENCOUNTER — HOSPITAL ENCOUNTER (OUTPATIENT)
Facility: HOSPITAL | Age: 30
Setting detail: RECURRING SERIES
Discharge: HOME | End: 2025-06-21
Payer: MEDICAID

## 2025-06-18 PROCEDURE — 97530 THERAPEUTIC ACTIVITIES: CPT

## 2025-06-18 PROCEDURE — 97110 THERAPEUTIC EXERCISES: CPT

## 2025-06-18 PROCEDURE — 97112 NEUROMUSCULAR REEDUCATION: CPT

## 2025-06-18 NOTE — PROGRESS NOTES
PHYSICAL / OCCUPATIONAL THERAPY - DAILY TREATMENT NOTE    Patient Name: Lucía Khan    Date: 2025    : 1995  Insurance: Payor: [facility] MEDICAID / Plan: DORA [facility] HEALTHKEEPERS PLUS / Product Type: *No Product type* /      Patient  verified Yes     Visit #   Current / Total 2 16   Time   In / Out 5:22 6:00   Pain   In / Out 2 2   Subjective Functional Status/Changes: My hips re more sore today.  I've been going to a masseuse, and she told me to start walking with my toes turned in because I was walking with them turned out.  I think it's made my muscles sore     TREATMENT AREA =  Right foot pain  Left foot pain  Right ankle pain  Left ankle pain  Chronic instability of ankle  Plantar fasciitis of left foot    OBJECTIVE    Therapeutic Procedures:    Tx Min Billable or 1:1 Min (if diff from Tx Min) Procedure, Rationale, Specifics   13  84376 Therapeutic Exercise (timed):  increase ROM, strength, coordination, balance, and proprioception to improve patient's ability to progress to PLOF and address remaining functional goals. (see flow sheet as applicable)     Details if applicable:       15  92623 Therapeutic Activity (timed):  use of dynamic activities replicating functional movements to increase ROM, strength, coordination, balance, and proprioception in order to improve patient's ability to progress to PLOF and address remaining functional goals.  (see flow sheet as applicable)     Details if applicable:     10  10238 Neuromuscular Re-Education (timed):  improve balance, coordination, kinesthetic sense, posture, core stability and proprioception to improve patient's ability to develop conscious control of individual muscles and awareness of position of extremities in order to progress to PLOF and address remaining functional goals. (see flow sheet as applicable)     Details if applicable:            Details if applicable:            Details if applicable:     38  [facility] Totals Reminder: bill

## 2025-06-25 ENCOUNTER — APPOINTMENT (OUTPATIENT)
Facility: HOSPITAL | Age: 30
End: 2025-06-25
Payer: MEDICAID

## 2025-06-30 ENCOUNTER — OFFICE VISIT (OUTPATIENT)
Facility: CLINIC | Age: 30
End: 2025-06-30
Payer: MEDICAID

## 2025-06-30 ENCOUNTER — TELEPHONE (OUTPATIENT)
Facility: CLINIC | Age: 30
End: 2025-06-30

## 2025-06-30 VITALS
RESPIRATION RATE: 18 BRPM | HEART RATE: 103 BPM | OXYGEN SATURATION: 98 % | DIASTOLIC BLOOD PRESSURE: 90 MMHG | WEIGHT: 202 LBS | HEIGHT: 68 IN | BODY MASS INDEX: 30.62 KG/M2 | TEMPERATURE: 97.8 F | SYSTOLIC BLOOD PRESSURE: 135 MMHG

## 2025-06-30 DIAGNOSIS — E66.09 CLASS 1 OBESITY DUE TO EXCESS CALORIES WITH SERIOUS COMORBIDITY AND BODY MASS INDEX (BMI) OF 30.0 TO 30.9 IN ADULT: ICD-10-CM

## 2025-06-30 DIAGNOSIS — E66.811 CLASS 1 OBESITY DUE TO EXCESS CALORIES WITH SERIOUS COMORBIDITY AND BODY MASS INDEX (BMI) OF 30.0 TO 30.9 IN ADULT: ICD-10-CM

## 2025-06-30 DIAGNOSIS — F41.9 ANXIETY AND DEPRESSION: Primary | ICD-10-CM

## 2025-06-30 DIAGNOSIS — R03.0 ELEVATED BLOOD PRESSURE READING: ICD-10-CM

## 2025-06-30 DIAGNOSIS — R00.0 TACHYCARDIA WITH HEART RATE 100-120 BEATS PER MINUTE: ICD-10-CM

## 2025-06-30 DIAGNOSIS — F32.A ANXIETY AND DEPRESSION: Primary | ICD-10-CM

## 2025-06-30 PROCEDURE — 99214 OFFICE O/P EST MOD 30 MIN: CPT | Performed by: STUDENT IN AN ORGANIZED HEALTH CARE EDUCATION/TRAINING PROGRAM

## 2025-06-30 PROCEDURE — 93000 ELECTROCARDIOGRAM COMPLETE: CPT | Performed by: STUDENT IN AN ORGANIZED HEALTH CARE EDUCATION/TRAINING PROGRAM

## 2025-06-30 PROCEDURE — G2211 COMPLEX E/M VISIT ADD ON: HCPCS | Performed by: STUDENT IN AN ORGANIZED HEALTH CARE EDUCATION/TRAINING PROGRAM

## 2025-06-30 SDOH — ECONOMIC STABILITY: FOOD INSECURITY: WITHIN THE PAST 12 MONTHS, THE FOOD YOU BOUGHT JUST DIDN'T LAST AND YOU DIDN'T HAVE MONEY TO GET MORE.: NEVER TRUE

## 2025-06-30 SDOH — ECONOMIC STABILITY: FOOD INSECURITY: WITHIN THE PAST 12 MONTHS, YOU WORRIED THAT YOUR FOOD WOULD RUN OUT BEFORE YOU GOT MONEY TO BUY MORE.: NEVER TRUE

## 2025-06-30 ASSESSMENT — ENCOUNTER SYMPTOMS
NAUSEA: 0
CHEST TIGHTNESS: 0
VOMITING: 0
RHINORRHEA: 0
SORE THROAT: 0
SHORTNESS OF BREATH: 0
ABDOMINAL PAIN: 0
ABDOMINAL DISTENTION: 0
DIARRHEA: 0

## 2025-06-30 ASSESSMENT — PATIENT HEALTH QUESTIONNAIRE - PHQ9
SUM OF ALL RESPONSES TO PHQ QUESTIONS 1-9: 7
4. FEELING TIRED OR HAVING LITTLE ENERGY: SEVERAL DAYS
6. FEELING BAD ABOUT YOURSELF - OR THAT YOU ARE A FAILURE OR HAVE LET YOURSELF OR YOUR FAMILY DOWN: SEVERAL DAYS
2. FEELING DOWN, DEPRESSED OR HOPELESS: SEVERAL DAYS
3. TROUBLE FALLING OR STAYING ASLEEP: SEVERAL DAYS
10. IF YOU CHECKED OFF ANY PROBLEMS, HOW DIFFICULT HAVE THESE PROBLEMS MADE IT FOR YOU TO DO YOUR WORK, TAKE CARE OF THINGS AT HOME, OR GET ALONG WITH OTHER PEOPLE: SOMEWHAT DIFFICULT
7. TROUBLE CONCENTRATING ON THINGS, SUCH AS READING THE NEWSPAPER OR WATCHING TELEVISION: SEVERAL DAYS
9. THOUGHTS THAT YOU WOULD BE BETTER OFF DEAD, OR OF HURTING YOURSELF: NOT AT ALL
5. POOR APPETITE OR OVEREATING: SEVERAL DAYS
8. MOVING OR SPEAKING SO SLOWLY THAT OTHER PEOPLE COULD HAVE NOTICED. OR THE OPPOSITE, BEING SO FIGETY OR RESTLESS THAT YOU HAVE BEEN MOVING AROUND A LOT MORE THAN USUAL: SEVERAL DAYS

## 2025-06-30 NOTE — PROGRESS NOTES
Lucía Khan is a 30 y.o. female presenting today for Other (Acute visit.)  .     Chief Complaint   Patient presents with    Other     Acute visit.       HPI:  Lucía Khan presents to the office today for acute visit.    Patient has a PMH of PMH of left knee patellofemoral syndrome, left foot plantar fasciitis, endometriosis, ADHD, anxiety and depression, panic attacks, bipolar disorder, GERD, seasonal allergies     Patient reports her blood pressure is elevated at 146/95, and racing heart rate. She spoke with her psychiatrist today who wanted her to get an EKG. patient reports over the past 2 months her Geodon was increased to 80 mg at night she takes 20 mg in the morning.  She reports ever since this medication increase she has noticed something was off.  She suspects her high blood pressure and elevated heart rate are from this medication.  Patient also reports as of reasons her anxiety has been \" of the chart\" she tried to get her mental health in check.  Headaches appointment with her psychiatrist is in 2 weeks.  Occasionally she reports some chest pain, and lightheadedness, reports constant heat intolerance. Patient just presented from work and reports their air conditioning broke down and she is having to work is hot conditions.       Anxiety and depression, panic attacks, bipolar disorder: Patient is on Geodon, Zyprexa, Wellbutrin, Xanax as needed.  Follows closely with psychiatry     Review of Systems   Constitutional:  Negative for chills, diaphoresis, fatigue and fever.   HENT:  Negative for ear pain, rhinorrhea and sore throat.    Eyes:  Negative for visual disturbance.   Respiratory:  Negative for chest tightness and shortness of breath.    Cardiovascular:  Negative for chest pain and leg swelling.   Gastrointestinal:  Negative for abdominal distention, abdominal pain, diarrhea, nausea and vomiting.   Genitourinary:  Negative for difficulty urinating and dysuria.   Musculoskeletal:

## 2025-07-01 DIAGNOSIS — R00.0 TACHYCARDIA WITH HEART RATE 100-120 BEATS PER MINUTE: ICD-10-CM

## 2025-07-02 ENCOUNTER — RESULTS FOLLOW-UP (OUTPATIENT)
Facility: CLINIC | Age: 30
End: 2025-07-02

## 2025-07-02 DIAGNOSIS — D75.89 MACROCYTOSIS: Primary | ICD-10-CM

## 2025-07-02 LAB
ALBUMIN SERPL-MCNC: 4.1 G/DL (ref 4–5)
ALP SERPL-CCNC: 93 IU/L (ref 44–121)
ALT SERPL-CCNC: 11 IU/L (ref 0–32)
APPEARANCE UR: ABNORMAL
AST SERPL-CCNC: 17 IU/L (ref 0–40)
BASOPHILS # BLD AUTO: 0 X10E3/UL (ref 0–0.2)
BASOPHILS NFR BLD AUTO: 0 %
BILIRUB DIRECT SERPL-MCNC: 0.15 MG/DL (ref 0–0.4)
BILIRUB SERPL-MCNC: 0.5 MG/DL (ref 0–1.2)
BILIRUB UR QL STRIP: NEGATIVE
BUN SERPL-MCNC: 16 MG/DL (ref 6–20)
BUN/CREAT SERPL: 21 (ref 9–23)
CALCIUM SERPL-MCNC: 9.2 MG/DL (ref 8.7–10.2)
CHLORIDE SERPL-SCNC: 106 MMOL/L (ref 96–106)
CHOLEST SERPL-MCNC: 182 MG/DL (ref 100–199)
CO2 SERPL-SCNC: 17 MMOL/L (ref 20–29)
COLOR UR: YELLOW
CREAT SERPL-MCNC: 0.77 MG/DL (ref 0.57–1)
EGFRCR SERPLBLD CKD-EPI 2021: 106 ML/MIN/1.73
EOSINOPHIL # BLD AUTO: 0.1 X10E3/UL (ref 0–0.4)
EOSINOPHIL NFR BLD AUTO: 1 %
ERYTHROCYTE [DISTWIDTH] IN BLOOD BY AUTOMATED COUNT: 12 % (ref 11.7–15.4)
GLUCOSE SERPL-MCNC: 128 MG/DL (ref 70–99)
GLUCOSE UR QL STRIP: NEGATIVE
HCT VFR BLD AUTO: 45.2 % (ref 34–46.6)
HCV IGG SERPL QL IA: NON REACTIVE
HDLC SERPL-MCNC: 48 MG/DL
HGB BLD-MCNC: 14.8 G/DL (ref 11.1–15.9)
HGB UR QL STRIP: NEGATIVE
IMM GRANULOCYTES # BLD AUTO: 0 X10E3/UL (ref 0–0.1)
IMM GRANULOCYTES NFR BLD AUTO: 0 %
KETONES UR QL STRIP: NEGATIVE
LDLC SERPL CALC-MCNC: 110 MG/DL (ref 0–99)
LEUKOCYTE ESTERASE UR QL STRIP: NEGATIVE
LYMPHOCYTES # BLD AUTO: 3.2 X10E3/UL (ref 0.7–3.1)
LYMPHOCYTES NFR BLD AUTO: 36 %
MCH RBC QN AUTO: 33.4 PG (ref 26.6–33)
MCHC RBC AUTO-ENTMCNC: 32.7 G/DL (ref 31.5–35.7)
MCV RBC AUTO: 102 FL (ref 79–97)
MONOCYTES # BLD AUTO: 0.5 X10E3/UL (ref 0.1–0.9)
MONOCYTES NFR BLD AUTO: 6 %
NEUTROPHILS # BLD AUTO: 5.1 X10E3/UL (ref 1.4–7)
NEUTROPHILS NFR BLD AUTO: 57 %
NITRITE UR QL STRIP: NEGATIVE
PH UR STRIP: 6 [PH] (ref 5–7.5)
PLATELET # BLD AUTO: 308 X10E3/UL (ref 150–450)
POTASSIUM SERPL-SCNC: 4.1 MMOL/L (ref 3.5–5.2)
PROT SERPL-MCNC: 6.7 G/DL (ref 6–8.5)
PROT UR QL STRIP: NEGATIVE
RBC # BLD AUTO: 4.43 X10E6/UL (ref 3.77–5.28)
SODIUM SERPL-SCNC: 139 MMOL/L (ref 134–144)
SP GR UR STRIP: 1.02 (ref 1–1.03)
TRIGL SERPL-MCNC: 135 MG/DL (ref 0–149)
TSH SERPL DL<=0.005 MIU/L-ACNC: 0.69 UIU/ML (ref 0.45–4.5)
UROBILINOGEN UR STRIP-MCNC: 0.2 MG/DL (ref 0.2–1)
VLDLC SERPL CALC-MCNC: 24 MG/DL (ref 5–40)
WBC # BLD AUTO: 9.1 X10E3/UL (ref 3.4–10.8)

## 2025-07-03 ENCOUNTER — TELEPHONE (OUTPATIENT)
Facility: CLINIC | Age: 30
End: 2025-07-03

## 2025-07-03 DIAGNOSIS — R03.0 ELEVATED BLOOD PRESSURE READING: Primary | ICD-10-CM

## 2025-07-03 LAB — BACTERIA UR CULT: NORMAL

## 2025-07-03 RX ORDER — AMLODIPINE BESYLATE 5 MG/1
5 TABLET ORAL DAILY
Qty: 90 TABLET | Refills: 0 | Status: SHIPPED | OUTPATIENT
Start: 2025-07-03

## 2025-07-03 NOTE — TELEPHONE ENCOUNTER
Triage call pt reporting reading bp last night 140/90 then this morning 131/89, advised will let provider and nurse advised next step, therapist did prescribe new medication stated not working

## 2025-07-07 ENCOUNTER — TELEPHONE (OUTPATIENT)
Facility: CLINIC | Age: 30
End: 2025-07-07

## 2025-07-07 DIAGNOSIS — R00.0 TACHYCARDIA WITH HEART RATE 121-140 BEATS PER MINUTE: Primary | ICD-10-CM

## 2025-07-07 RX ORDER — METOPROLOL SUCCINATE 25 MG/1
25 TABLET, EXTENDED RELEASE ORAL DAILY
Qty: 30 TABLET | Refills: 0 | Status: SHIPPED | OUTPATIENT
Start: 2025-07-07 | End: 2025-08-06

## 2025-07-09 RX ORDER — LANSOPRAZOLE 30 MG/1
CAPSULE, DELAYED RELEASE ORAL DAILY
Qty: 30 CAPSULE | Refills: 2 | Status: SHIPPED | OUTPATIENT
Start: 2025-07-09

## 2025-07-10 DIAGNOSIS — J30.2 SEASONAL ALLERGIC RHINITIS, UNSPECIFIED TRIGGER: ICD-10-CM

## 2025-07-11 RX ORDER — FLUTICASONE PROPIONATE 50 MCG
SPRAY, SUSPENSION (ML) NASAL
Qty: 16 ML | Refills: 1 | Status: SHIPPED | OUTPATIENT
Start: 2025-07-11

## 2025-08-07 ENCOUNTER — TELEPHONE (OUTPATIENT)
Facility: CLINIC | Age: 30
End: 2025-08-07

## 2025-08-07 DIAGNOSIS — K64.8 HEMORRHOID PROLAPSE: Primary | ICD-10-CM

## 2025-08-12 ENCOUNTER — OFFICE VISIT (OUTPATIENT)
Age: 30
End: 2025-08-12
Payer: MEDICAID

## 2025-08-12 DIAGNOSIS — Q79.60 EHLERS-DANLOS SYNDROME: ICD-10-CM

## 2025-08-12 DIAGNOSIS — R00.0 TACHYCARDIA WITH HEART RATE 121-140 BEATS PER MINUTE: ICD-10-CM

## 2025-08-12 DIAGNOSIS — M25.373 INSTABILITY OF ANKLE, UNSPECIFIED LATERALITY: Primary | ICD-10-CM

## 2025-08-12 DIAGNOSIS — M72.2 PLANTAR FASCIITIS OF LEFT FOOT: ICD-10-CM

## 2025-08-12 PROCEDURE — 99213 OFFICE O/P EST LOW 20 MIN: CPT | Performed by: ORTHOPAEDIC SURGERY

## 2025-08-14 ENCOUNTER — TELEPHONE (OUTPATIENT)
Facility: CLINIC | Age: 30
End: 2025-08-14

## 2025-08-14 DIAGNOSIS — R00.0 TACHYCARDIA WITH HEART RATE 121-140 BEATS PER MINUTE: ICD-10-CM

## 2025-08-14 RX ORDER — METOPROLOL SUCCINATE 25 MG/1
25 TABLET, EXTENDED RELEASE ORAL 2 TIMES DAILY
Qty: 60 TABLET | Refills: 0 | Status: SHIPPED | OUTPATIENT
Start: 2025-08-14 | End: 2025-09-13

## 2025-08-14 RX ORDER — METOPROLOL SUCCINATE 25 MG/1
25 TABLET, EXTENDED RELEASE ORAL DAILY
Qty: 30 TABLET | Refills: 0 | OUTPATIENT
Start: 2025-08-14

## 2025-08-15 DIAGNOSIS — J30.2 SEASONAL ALLERGIC RHINITIS, UNSPECIFIED TRIGGER: ICD-10-CM

## 2025-08-18 RX ORDER — ALBUTEROL SULFATE 90 UG/1
INHALANT RESPIRATORY (INHALATION)
Qty: 18 EACH | Refills: 2 | Status: SHIPPED | OUTPATIENT
Start: 2025-08-18

## 2025-08-20 DIAGNOSIS — M25.552 BILATERAL HIP PAIN: ICD-10-CM

## 2025-08-20 DIAGNOSIS — M25.562 CHRONIC PAIN OF LEFT KNEE: ICD-10-CM

## 2025-08-20 DIAGNOSIS — M25.551 BILATERAL HIP PAIN: ICD-10-CM

## 2025-08-20 DIAGNOSIS — G89.29 CHRONIC PAIN OF LEFT KNEE: ICD-10-CM

## 2025-08-20 RX ORDER — MELOXICAM 15 MG/1
15 TABLET ORAL DAILY
Qty: 30 TABLET | Refills: 3 | Status: SHIPPED | OUTPATIENT
Start: 2025-08-20